# Patient Record
Sex: FEMALE | Race: BLACK OR AFRICAN AMERICAN | Employment: UNEMPLOYED | URBAN - METROPOLITAN AREA
[De-identification: names, ages, dates, MRNs, and addresses within clinical notes are randomized per-mention and may not be internally consistent; named-entity substitution may affect disease eponyms.]

---

## 2018-01-02 ENCOUNTER — HOSPITAL ENCOUNTER (EMERGENCY)
Age: 53
Discharge: HOME | End: 2018-01-02
Attending: EMERGENCY MEDICINE
Payer: MEDICAID

## 2018-01-02 VITALS
BODY MASS INDEX: 30.36 KG/M2 | TEMPERATURE: 98.4 F | HEIGHT: 62 IN | DIASTOLIC BLOOD PRESSURE: 105 MMHG | HEART RATE: 95 BPM | SYSTOLIC BLOOD PRESSURE: 167 MMHG | WEIGHT: 165 LBS | RESPIRATION RATE: 16 BRPM | OXYGEN SATURATION: 100 %

## 2018-01-02 DIAGNOSIS — G56.03 BILATERAL CARPAL TUNNEL SYNDROME: Primary | ICD-10-CM

## 2018-01-02 PROCEDURE — 99283 EMERGENCY DEPT VISIT LOW MDM: CPT

## 2018-01-02 PROCEDURE — 6370000000 HC RX 637 (ALT 250 FOR IP): Performed by: EMERGENCY MEDICINE

## 2018-01-02 RX ORDER — IBUPROFEN 800 MG/1
800 TABLET ORAL EVERY 8 HOURS PRN
Qty: 30 TABLET | Refills: 1 | Status: SHIPPED | OUTPATIENT
Start: 2018-01-02

## 2018-01-02 RX ORDER — IBUPROFEN 800 MG/1
800 TABLET ORAL ONCE
Status: COMPLETED | OUTPATIENT
Start: 2018-01-02 | End: 2018-01-02

## 2018-01-02 RX ADMIN — IBUPROFEN 800 MG: 800 TABLET, FILM COATED ORAL at 20:39

## 2018-01-02 ASSESSMENT — ENCOUNTER SYMPTOMS
SHORTNESS OF BREATH: 0
SORE THROAT: 0
BACK PAIN: 0
DIARRHEA: 0
VOMITING: 0
COUGH: 0

## 2018-01-02 ASSESSMENT — PAIN DESCRIPTION - DESCRIPTORS: DESCRIPTORS: BURNING;PINS AND NEEDLES

## 2018-01-02 ASSESSMENT — PAIN DESCRIPTION - ORIENTATION: ORIENTATION: RIGHT;LEFT

## 2018-01-02 ASSESSMENT — PAIN DESCRIPTION - PAIN TYPE: TYPE: ACUTE PAIN

## 2018-01-02 ASSESSMENT — PAIN SCALES - GENERAL
PAINLEVEL_OUTOF10: 5
PAINLEVEL_OUTOF10: 4

## 2018-01-02 ASSESSMENT — PAIN DESCRIPTION - LOCATION: LOCATION: HAND

## 2018-01-03 NOTE — ED PROVIDER NOTES
[facility]  ED  Emergency Department Encounter  Emergency Medicine Resident     Pt Name: Tahir Swift  MRN: [id number]  [place] 1965  Date of evaluation: 1/2/18  PCP:  Elenora Collet, [address] [place] [address]       Chief Complaint   Patient presents with    Numbness     bilat hand numbness radiating to elbows x 2 months       HISTORY OF PRESENT ILLNESS  (Location/Symptom, Timing/Onset, Context/Setting, Quality, Duration, Modifying Factors, Severity.)      Tahir Swift is a 47 y.o. female who presents with hand numbness and pain. Patient states for the past few months she has been dealing with bilateral hand numbness left greater than right. Patient is right-hand dominant. Patient states she has dropped her cigarette today and this alarmed her to come get evaluated. Patient denies any recent injury or trauma. Patient states she has the numbness and burning in all her fingers except her pinky fingers. Patient states sometimes it radiates up to her elbows bilaterally. Patient states is worse in the morning when she wakes up. Patient works at [employer] doing repetitive hand movements. Patient denies any fever or chills, nausea or vomiting, diarrhea, chest pain or shortness of breath. Patient denies any weakness numbness or tingling in her legs. Patient denies any neck pain. Patient has been trying Tylenol and Motrin with minimal relief. Patient denies any trauma or falls. PAST MEDICAL / SURGICAL / SOCIAL / FAMILY HISTORY      has a past medical history of Anxiety; Depression; and Hypertension. No past surgeries after reviewing this with the patient. Social History     Social History    Marital status: Single     Spouse name: N/A    Number of children: N/A    Years of education: N/A     Occupational History    Not on file.      Social History Main Topics    Smoking status: Current Every Day Smoker    Smokeless tobacco: Never Used    Alcohol use Yes      Comment: pt states a

## 2018-01-03 NOTE — ED PROVIDER NOTES
[facility]     Emergency Department     Faculty Attestation    I performed a history and physical examination of the patient and discussed management with the resident. I have reviewed and agree with the residents findings including all diagnostic interpretations, and treatment plans as written. Any areas of disagreement are noted on the chart. I was personally present for the key portions of any procedures. I have documented in the chart those procedures where I was not present during the key portions. I have reviewed the emergency nurses triage note. I agree with the chief complaint, past medical history, past surgical history, allergies, medications, social and family history as documented unless otherwise noted below. Documentation of the HPI, Physical Exam and Medical Decision Making performed by [email or web address] is based on my personal performance of the HPI, PE and MDM. For Physician Assistant/ Nurse Practitioner cases/documentation I have personally evaluated this patient and have completed at least one if not all key elements of the E/M (history, physical exam, and MDM). Additional findings are as noted. Primary Care Physician: Kerline Burleson,     History: This is a 47 y.o. female who presents to the Emergency Department with complaint of pain, numbness and tingling in her bilateral hands, patient reports symptoms in her thumb, index, and middle fingers. She denies symptoms in her pinkie finger. She denies prior episodes of this in the past. Has been having symptoms daily for past few months. She is RHD, and worse symptoms in her L hands. No trauma to the area. 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 Physical:   height is 5' 2\" (1.575 m) and weight is 165 lb (74.8 kg). Her temperature is 98.4 °F (36.9 °C). Her blood pressure is 167/105 (abnormal) and her pulse is 95. Her respiration is 16 and oxygen saturation is 100%. Patient with positive Tinel's, and positive Phalens test,   Patient is able to flex and extend at the wrist without decreased range of motion. She has sensation to light touch intact in all of her fingers, capillary refill less than 2 seconds. Palpable radial pulse that is equal bilaterally, no discoloration noted. Impression: bilateral carpal tunnel    Plan: Cock up splint, NSAIDS,   Discussed conservative management with patient, and off work for 2 days, pcp follow up. Pre-hypertension/Hypertension: The patient has been informed that they may have pre-hypertension or Hypertension based on a blood pressure reading in the emergency department. I recommend that the patient call the primary care provider listed on their discharge instructions or a physician of their choice this week to arrange follow up for further evaluation of possible pre-hypertension or Hypertension.         Krishna Morton D.O, M.P.H  Attending Emergency Medicine Physician         Krishna Morton DO  01/02/18 2044

## 2018-03-20 ENCOUNTER — HOSPITAL ENCOUNTER (OUTPATIENT)
Age: 53
Setting detail: SPECIMEN
Discharge: HOME | End: 2018-03-20
Payer: MEDICAID

## 2018-03-20 LAB
TSH SERPL DL<=0.05 MIU/L-ACNC: 0.99 MIU/L (ref 0.3–5)
VITAMIN B-12: 436 PG/ML (ref 232–1245)

## 2019-02-08 ENCOUNTER — HOSPITAL ENCOUNTER (OUTPATIENT)
Age: 54
Setting detail: SPECIMEN
Discharge: HOME | End: 2019-02-08
Payer: MEDICAID

## 2019-02-08 LAB
ABSOLUTE EOS #: 0.13 K/UL (ref 0–0.44)
ABSOLUTE IMMATURE GRANULOCYTE: <0.03 K/UL (ref 0–0.3)
ABSOLUTE LYMPH #: 1.82 K/UL (ref 1.1–3.7)
ABSOLUTE MONO #: 0.33 K/UL (ref 0.1–1.2)
ALBUMIN SERPL-MCNC: 4.6 G/DL (ref 3.5–5.2)
ALBUMIN/GLOBULIN RATIO: 1.3 (ref 1–2.5)
ALP BLD-CCNC: 95 U/L (ref 35–104)
ALT SERPL-CCNC: 30 U/L (ref 5–33)
ANION GAP SERPL CALCULATED.3IONS-SCNC: 17 MMOL/L (ref 9–17)
AST SERPL-CCNC: 33 U/L
BASOPHILS # BLD: 1 % (ref 0–2)
BASOPHILS ABSOLUTE: 0.05 K/UL (ref 0–0.2)
BILIRUB SERPL-MCNC: 0.67 MG/DL (ref 0.3–1.2)
BUN BLDV-MCNC: 14 MG/DL (ref 6–20)
BUN/CREAT BLD: ABNORMAL (ref 9–20)
CALCIUM SERPL-MCNC: 9.3 MG/DL (ref 8.6–10.4)
CHLORIDE BLD-SCNC: 105 MMOL/L (ref 98–107)
CHOLESTEROL/HDL RATIO: 2.9
CHOLESTEROL: 190 MG/DL
CO2: 19 MMOL/L (ref 20–31)
CREAT SERPL-MCNC: 0.53 MG/DL (ref 0.5–0.9)
DIFFERENTIAL TYPE: NORMAL
EOSINOPHILS RELATIVE PERCENT: 3 % (ref 1–4)
ESTIMATED AVERAGE GLUCOSE: 111 MG/DL
GFR AFRICAN AMERICAN: >60 ML/MIN
GFR NON-AFRICAN AMERICAN: >60 ML/MIN
GFR SERPL CREATININE-BSD FRML MDRD: ABNORMAL ML/MIN/{1.73_M2}
GFR SERPL CREATININE-BSD FRML MDRD: ABNORMAL ML/MIN/{1.73_M2}
GLUCOSE BLD-MCNC: 94 MG/DL (ref 70–99)
HBA1C MFR BLD: 5.5 % (ref 4–6)
HCT VFR BLD CALC: 41 % (ref 36.3–47.1)
HDLC SERPL-MCNC: 66 MG/DL
HEMOGLOBIN: 13.6 G/DL (ref 11.9–15.1)
IMMATURE GRANULOCYTES: 0 %
LDL CHOLESTEROL: 92 MG/DL (ref 0–130)
LYMPHOCYTES # BLD: 37 % (ref 24–43)
MCH RBC QN AUTO: 32.2 PG (ref 25.2–33.5)
MCHC RBC AUTO-ENTMCNC: 33.2 G/DL (ref 28.4–34.8)
MCV RBC AUTO: 96.9 FL (ref 82.6–102.9)
MONOCYTES # BLD: 7 % (ref 3–12)
NRBC AUTOMATED: 0 PER 100 WBC
PDW BLD-RTO: 12.7 % (ref 11.8–14.4)
PLATELET # BLD: 340 K/UL (ref 138–453)
PLATELET ESTIMATE: NORMAL
PMV BLD AUTO: 9.7 FL (ref 8.1–13.5)
POTASSIUM SERPL-SCNC: 3.6 MMOL/L (ref 3.7–5.3)
RBC # BLD: 4.23 M/UL (ref 3.95–5.11)
RBC # BLD: NORMAL 10*6/UL
SEG NEUTROPHILS: 52 % (ref 36–65)
SEGMENTED NEUTROPHILS ABSOLUTE COUNT: 2.56 K/UL (ref 1.5–8.1)
SODIUM BLD-SCNC: 141 MMOL/L (ref 135–144)
TOTAL PROTEIN: 8.1 G/DL (ref 6.4–8.3)
TRIGL SERPL-MCNC: 158 MG/DL
VLDLC SERPL CALC-MCNC: ABNORMAL MG/DL (ref 1–30)
WBC # BLD: 4.9 K/UL (ref 3.5–11.3)
WBC # BLD: NORMAL 10*3/UL

## 2020-05-26 ENCOUNTER — HOSPITAL ENCOUNTER (EMERGENCY)
Age: 55
Discharge: HOME | End: 2020-05-26
Attending: EMERGENCY MEDICINE
Payer: MEDICAID

## 2020-05-26 VITALS
RESPIRATION RATE: 18 BRPM | TEMPERATURE: 99.4 F | OXYGEN SATURATION: 98 % | DIASTOLIC BLOOD PRESSURE: 86 MMHG | SYSTOLIC BLOOD PRESSURE: 154 MMHG | HEART RATE: 86 BPM

## 2020-05-26 LAB
ABSOLUTE EOS #: 0.13 K/UL (ref 0–0.44)
ABSOLUTE IMMATURE GRANULOCYTE: <0.03 K/UL (ref 0–0.3)
ABSOLUTE LYMPH #: 2.66 K/UL (ref 1.1–3.7)
ABSOLUTE MONO #: 0.61 K/UL (ref 0.1–1.2)
ANION GAP SERPL CALCULATED.3IONS-SCNC: 15 MMOL/L (ref 9–17)
BASOPHILS # BLD: 1 % (ref 0–2)
BASOPHILS ABSOLUTE: 0.06 K/UL (ref 0–0.2)
BUN BLDV-MCNC: 16 MG/DL (ref 6–20)
BUN/CREAT BLD: ABNORMAL (ref 9–20)
CALCIUM SERPL-MCNC: 9.1 MG/DL (ref 8.6–10.4)
CHLORIDE BLD-SCNC: 104 MMOL/L (ref 98–107)
CO2: 19 MMOL/L (ref 20–31)
CREAT SERPL-MCNC: 0.36 MG/DL (ref 0.5–0.9)
DIFFERENTIAL TYPE: ABNORMAL
EOSINOPHILS RELATIVE PERCENT: 2 % (ref 1–4)
GFR AFRICAN AMERICAN: >60 ML/MIN
GFR NON-AFRICAN AMERICAN: >60 ML/MIN
GFR SERPL CREATININE-BSD FRML MDRD: ABNORMAL ML/MIN/{1.73_M2}
GFR SERPL CREATININE-BSD FRML MDRD: ABNORMAL ML/MIN/{1.73_M2}
GLUCOSE BLD-MCNC: 96 MG/DL (ref 70–99)
HCT VFR BLD CALC: 38 % (ref 36.3–47.1)
HEMOGLOBIN: 13 G/DL (ref 11.9–15.1)
IMMATURE GRANULOCYTES: 0 %
LYMPHOCYTES # BLD: 40 % (ref 24–43)
MCH RBC QN AUTO: 33.1 PG (ref 25.2–33.5)
MCHC RBC AUTO-ENTMCNC: 34.2 G/DL (ref 28.4–34.8)
MCV RBC AUTO: 96.7 FL (ref 82.6–102.9)
MONOCYTES # BLD: 9 % (ref 3–12)
NRBC AUTOMATED: 0 PER 100 WBC
PDW BLD-RTO: 12.9 % (ref 11.8–14.4)
PLATELET # BLD: 360 K/UL (ref 138–453)
PLATELET ESTIMATE: ABNORMAL
PMV BLD AUTO: 9 FL (ref 8.1–13.5)
POTASSIUM SERPL-SCNC: 4.5 MMOL/L (ref 3.7–5.3)
RBC # BLD: 3.93 M/UL (ref 3.95–5.11)
RBC # BLD: ABNORMAL 10*6/UL
SEG NEUTROPHILS: 48 % (ref 36–65)
SEGMENTED NEUTROPHILS ABSOLUTE COUNT: 3.14 K/UL (ref 1.5–8.1)
SODIUM BLD-SCNC: 138 MMOL/L (ref 135–144)
WBC # BLD: 6.6 K/UL (ref 3.5–11.3)
WBC # BLD: ABNORMAL 10*3/UL

## 2020-05-26 PROCEDURE — 85025 COMPLETE CBC W/AUTO DIFF WBC: CPT

## 2020-05-26 PROCEDURE — 6360000002 HC RX W HCPCS: Performed by: STUDENT IN AN ORGANIZED HEALTH CARE EDUCATION/TRAINING PROGRAM

## 2020-05-26 PROCEDURE — 96375 TX/PRO/DX INJ NEW DRUG ADDON: CPT

## 2020-05-26 PROCEDURE — 6370000000 HC RX 637 (ALT 250 FOR IP): Performed by: STUDENT IN AN ORGANIZED HEALTH CARE EDUCATION/TRAINING PROGRAM

## 2020-05-26 PROCEDURE — 80048 BASIC METABOLIC PNL TOTAL CA: CPT

## 2020-05-26 PROCEDURE — 99284 EMERGENCY DEPT VISIT MOD MDM: CPT

## 2020-05-26 PROCEDURE — 96374 THER/PROPH/DIAG INJ IV PUSH: CPT

## 2020-05-26 PROCEDURE — 93005 ELECTROCARDIOGRAM TRACING: CPT | Performed by: STUDENT IN AN ORGANIZED HEALTH CARE EDUCATION/TRAINING PROGRAM

## 2020-05-26 RX ORDER — PROCHLORPERAZINE EDISYLATE 5 MG/ML
10 INJECTION INTRAMUSCULAR; INTRAVENOUS ONCE
Status: COMPLETED | OUTPATIENT
Start: 2020-05-26 | End: 2020-05-26

## 2020-05-26 RX ORDER — DIPHENHYDRAMINE HYDROCHLORIDE 50 MG/ML
25 INJECTION INTRAMUSCULAR; INTRAVENOUS EVERY 6 HOURS PRN
Status: DISCONTINUED | OUTPATIENT
Start: 2020-05-26 | End: 2020-05-26 | Stop reason: HOSPADM

## 2020-05-26 RX ORDER — LISINOPRIL 2.5 MG/1
2.5 TABLET ORAL DAILY
Status: DISCONTINUED | OUTPATIENT
Start: 2020-05-26 | End: 2020-05-26 | Stop reason: HOSPADM

## 2020-05-26 RX ORDER — HYDROCHLOROTHIAZIDE 25 MG/1
12.5 TABLET ORAL DAILY
Status: DISCONTINUED | OUTPATIENT
Start: 2020-05-26 | End: 2020-05-26 | Stop reason: HOSPADM

## 2020-05-26 RX ORDER — HYDROCHLOROTHIAZIDE 25 MG/1
25 TABLET ORAL EVERY MORNING
Qty: 30 TABLET | Refills: 0 | Status: SHIPPED | OUTPATIENT
Start: 2020-05-26

## 2020-05-26 RX ORDER — LISINOPRIL 5 MG/1
2.5 TABLET ORAL DAILY
Qty: 15 TABLET | Refills: 0 | Status: SHIPPED | OUTPATIENT
Start: 2020-05-26

## 2020-05-26 RX ADMIN — HYDROCHLOROTHIAZIDE 12.5 MG: 25 TABLET ORAL at 13:52

## 2020-05-26 RX ADMIN — LISINOPRIL 2.5 MG: 2.5 TABLET ORAL at 13:52

## 2020-05-26 RX ADMIN — DIPHENHYDRAMINE HYDROCHLORIDE 25 MG: 50 INJECTION, SOLUTION INTRAMUSCULAR; INTRAVENOUS at 12:17

## 2020-05-26 RX ADMIN — PROCHLORPERAZINE EDISYLATE 10 MG: 5 INJECTION INTRAMUSCULAR; INTRAVENOUS at 12:18

## 2020-05-26 ASSESSMENT — ENCOUNTER SYMPTOMS
RHINORRHEA: 0
PHOTOPHOBIA: 0
DIARRHEA: 0
CHEST TIGHTNESS: 0
VOMITING: 0
SHORTNESS OF BREATH: 0
NAUSEA: 0
TROUBLE SWALLOWING: 0
WHEEZING: 0
BACK PAIN: 0
ABDOMINAL DISTENTION: 0
ABDOMINAL PAIN: 0
SORE THROAT: 0
CONSTIPATION: 0

## 2020-05-26 ASSESSMENT — PAIN SCALES - GENERAL: PAINLEVEL_OUTOF10: 7

## 2020-05-26 ASSESSMENT — PAIN DESCRIPTION - LOCATION: LOCATION: HEAD

## 2020-05-26 ASSESSMENT — PAIN DESCRIPTION - PAIN TYPE: TYPE: ACUTE PAIN

## 2020-05-26 ASSESSMENT — PAIN DESCRIPTION - DESCRIPTORS: DESCRIPTORS: ACHING

## 2020-05-26 NOTE — ED PROVIDER NOTES
Substance and Sexual Activity    Alcohol use: Yes     Comment: pt states a lot. She likes beer    Drug use: No    Sexual activity: Not on file   Lifestyle    Physical activity     Days per week: Not on file     Minutes per session: Not on file    Stress: Not on file   Relationships    Social connections     Talks on phone: Not on file     Gets together: Not on file     Attends Hinduism service: Not on file     Active member of club or organization: Not on file     Attends meetings of clubs or organizations: Not on file     Relationship status: Not on file    Intimate partner violence     Fear of current or ex partner: Not on file     Emotionally abused: Not on file     Physically abused: Not on file     Forced sexual activity: Not on file   Other Topics Concern    Not on file   Social History Narrative    Not on file       History reviewed. No pertinent family history. Allergies:  Patient has no known allergies. Home Medications:  Prior to Admission medications    Medication Sig Start Date End Date Taking? Authorizing Provider   lisinopril (PRINIVIL;ZESTRIL) 5 MG tablet Take 0.5 tablets by mouth daily 5/26/20  Yes Rosas Isabel MD   hydroCHLOROthiazide (HYDRODIURIL) 25 MG tablet Take 1 tablet by mouth every morning 5/26/20  Yes Rosas Isabel MD   ibuprofen (ADVIL;MOTRIN) 800 MG tablet Take 1 tablet by mouth every 8 hours as needed for Pain 1/2/18   Renetta Rios MD   ondansetron (ZOFRAN ODT) 4 MG disintegrating tablet Take 1 tablet by mouth every 8 hours as needed for Nausea. 2/26/15   Monique JOSETTE Riccip, APRN - CNP       REVIEW OFSYSTEMS    (2-9 systems for level 4, 10 or more for level 5)      Review of Systems   Constitutional: Negative for chills, fatigue and fever. HENT: Negative for hearing loss, rhinorrhea, sneezing, sore throat, tinnitus and trouble swallowing. Eyes: Negative for photophobia and visual disturbance.    Respiratory: Negative for chest tightness, shortness of breath and wheezing. Cardiovascular: Negative for chest pain and palpitations. Gastrointestinal: Negative for abdominal distention, abdominal pain, constipation, diarrhea, nausea and vomiting. Endocrine: Negative for polyuria. Genitourinary: Negative for dysuria, flank pain, frequency, vaginal bleeding and vaginal discharge. Musculoskeletal: Negative for arthralgias, back pain, gait problem and neck pain. Neurological: Positive for headaches. Negative for dizziness, tremors, seizures, syncope, facial asymmetry and numbness. Psychiatric/Behavioral: Negative for self-injury and suicidal ideas. PHYSICAL EXAM   (up to 7 for level 4, 8 or more forlevel 5)      INITIAL VITALS:   ED Triage Vitals   BP Temp Temp src Pulse Resp SpO2 Height Weight   05/26/20 1153 -- -- 05/26/20 1153 05/26/20 1152 05/26/20 1153 -- --   (!) 225/109   96 18 98 %         Physical Exam  Constitutional:       General: She is not in acute distress. Appearance: She is obese. She is not toxic-appearing or diaphoretic. HENT:      Head: Normocephalic and atraumatic. Eyes:      Extraocular Movements: Extraocular movements intact. Conjunctiva/sclera: Conjunctivae normal.      Pupils: Pupils are equal, round, and reactive to light. Neck:      Musculoskeletal: No neck rigidity or muscular tenderness. Cardiovascular:      Rate and Rhythm: Normal rate and regular rhythm. Pulses: Normal pulses. Pulmonary:      Effort: No respiratory distress. Breath sounds: Normal breath sounds. No wheezing. Abdominal:      General: There is no distension. Palpations: Abdomen is soft. Tenderness: There is no abdominal tenderness. Musculoskeletal: Normal range of motion. Skin:     General: Skin is dry. Neurological:      General: No focal deficit present. Mental Status: She is oriented to person, place, and time.    Psychiatric:         Mood and Affect: Mood normal.         Behavior: Behavior normal.

## 2020-05-27 LAB
EKG ATRIAL RATE: 88 BPM
EKG P AXIS: 69 DEGREES
EKG P-R INTERVAL: 146 MS
EKG Q-T INTERVAL: 380 MS
EKG QRS DURATION: 80 MS
EKG QTC CALCULATION (BAZETT): 459 MS
EKG R AXIS: 18 DEGREES
EKG T AXIS: 23 DEGREES
EKG VENTRICULAR RATE: 88 BPM

## 2020-05-27 PROCEDURE — 93010 ELECTROCARDIOGRAM REPORT: CPT | Performed by: INTERNAL MEDICINE

## 2020-11-11 ENCOUNTER — APPOINTMENT (OUTPATIENT)
Dept: GENERAL RADIOLOGY | Age: 55
End: 2020-11-11
Payer: MEDICAID

## 2020-11-11 ENCOUNTER — HOSPITAL ENCOUNTER (EMERGENCY)
Age: 55
Discharge: HOME | End: 2020-11-11
Attending: EMERGENCY MEDICINE
Payer: MEDICAID

## 2020-11-11 ENCOUNTER — APPOINTMENT (OUTPATIENT)
Dept: CT IMAGING | Age: 55
End: 2020-11-11
Payer: MEDICAID

## 2020-11-11 VITALS
WEIGHT: 170 LBS | RESPIRATION RATE: 20 BRPM | DIASTOLIC BLOOD PRESSURE: 81 MMHG | SYSTOLIC BLOOD PRESSURE: 166 MMHG | OXYGEN SATURATION: 100 % | HEART RATE: 93 BPM | BODY MASS INDEX: 27.32 KG/M2 | HEIGHT: 66 IN | TEMPERATURE: 97.8 F

## 2020-11-11 LAB
-: ABNORMAL
ABSOLUTE EOS #: 0.12 K/UL (ref 0–0.44)
ABSOLUTE IMMATURE GRANULOCYTE: <0.03 K/UL (ref 0–0.3)
ABSOLUTE LYMPH #: 1.83 K/UL (ref 1.1–3.7)
ABSOLUTE MONO #: 0.45 K/UL (ref 0.1–1.2)
ACETAMINOPHEN LEVEL: <5 UG/ML (ref 10–30)
ALBUMIN SERPL-MCNC: 4.4 G/DL (ref 3.5–5.2)
ALBUMIN/GLOBULIN RATIO: 1.4 (ref 1–2.5)
ALP BLD-CCNC: 101 U/L (ref 35–104)
ALT SERPL-CCNC: 64 U/L (ref 5–33)
AMMONIA: 19 UMOL/L (ref 11–51)
AMORPHOUS: ABNORMAL
ANION GAP SERPL CALCULATED.3IONS-SCNC: 10 MMOL/L (ref 9–17)
AST SERPL-CCNC: 60 U/L
BACTERIA: ABNORMAL
BASOPHILS # BLD: 1 % (ref 0–2)
BASOPHILS ABSOLUTE: 0.04 K/UL (ref 0–0.2)
BILIRUB SERPL-MCNC: 0.28 MG/DL (ref 0.3–1.2)
BILIRUBIN DIRECT: 0.09 MG/DL
BILIRUBIN URINE: NEGATIVE
BILIRUBIN, INDIRECT: 0.19 MG/DL (ref 0–1)
BUN BLDV-MCNC: 9 MG/DL (ref 6–20)
BUN/CREAT BLD: ABNORMAL (ref 9–20)
CALCIUM IONIZED: 1.16 MMOL/L (ref 1.13–1.33)
CALCIUM SERPL-MCNC: 9.3 MG/DL (ref 8.6–10.4)
CASTS UA: ABNORMAL /LPF (ref 0–8)
CHLORIDE BLD-SCNC: 104 MMOL/L (ref 98–107)
CO2: 24 MMOL/L (ref 20–31)
COLOR: YELLOW
CREAT SERPL-MCNC: 0.42 MG/DL (ref 0.5–0.9)
CRYSTALS, UA: ABNORMAL /HPF
DIFFERENTIAL TYPE: ABNORMAL
EOSINOPHILS RELATIVE PERCENT: 2 % (ref 1–4)
EPITHELIAL CELLS UA: ABNORMAL /HPF (ref 0–5)
ETHANOL PERCENT: <0.01 %
ETHANOL: <10 MG/DL
GFR AFRICAN AMERICAN: >60 ML/MIN
GFR NON-AFRICAN AMERICAN: >60 ML/MIN
GFR SERPL CREATININE-BSD FRML MDRD: ABNORMAL ML/MIN/{1.73_M2}
GFR SERPL CREATININE-BSD FRML MDRD: ABNORMAL ML/MIN/{1.73_M2}
GLOBULIN: ABNORMAL G/DL (ref 1.5–3.8)
GLUCOSE BLD-MCNC: 97 MG/DL (ref 70–99)
GLUCOSE URINE: NEGATIVE
HCT VFR BLD CALC: 36 % (ref 36.3–47.1)
HEMOGLOBIN: 11.7 G/DL (ref 11.9–15.1)
IMMATURE GRANULOCYTES: 0 %
INR BLD: 0.9
KETONES, URINE: NEGATIVE
LEUKOCYTE ESTERASE, URINE: NEGATIVE
LYMPHOCYTES # BLD: 31 % (ref 24–43)
MAGNESIUM: 2.1 MG/DL (ref 1.6–2.6)
MCH RBC QN AUTO: 32.4 PG (ref 25.2–33.5)
MCHC RBC AUTO-ENTMCNC: 32.5 G/DL (ref 28.4–34.8)
MCV RBC AUTO: 99.7 FL (ref 82.6–102.9)
MONOCYTES # BLD: 8 % (ref 3–12)
MUCUS: ABNORMAL
NITRITE, URINE: NEGATIVE
NRBC AUTOMATED: 0 PER 100 WBC
OTHER OBSERVATIONS UA: ABNORMAL
PDW BLD-RTO: 13 % (ref 11.8–14.4)
PH UA: 5 (ref 5–8)
PHOSPHORUS: 3.6 MG/DL (ref 2.6–4.5)
PLATELET # BLD: 306 K/UL (ref 138–453)
PLATELET ESTIMATE: ABNORMAL
PMV BLD AUTO: 8.8 FL (ref 8.1–13.5)
POTASSIUM SERPL-SCNC: 4.2 MMOL/L (ref 3.7–5.3)
PROTEIN UA: NEGATIVE
PROTHROMBIN TIME: 9.6 SEC (ref 9–12)
RBC # BLD: 3.61 M/UL (ref 3.95–5.11)
RBC # BLD: ABNORMAL 10*6/UL
RBC UA: ABNORMAL /HPF (ref 0–4)
RENAL EPITHELIAL, UA: ABNORMAL /HPF
SALICYLATE LEVEL: <1 MG/DL (ref 3–10)
SEG NEUTROPHILS: 58 % (ref 36–65)
SEGMENTED NEUTROPHILS ABSOLUTE COUNT: 3.39 K/UL (ref 1.5–8.1)
SODIUM BLD-SCNC: 138 MMOL/L (ref 135–144)
SPECIFIC GRAVITY UA: 1.01 (ref 1–1.03)
TOTAL PROTEIN: 7.6 G/DL (ref 6.4–8.3)
TOXIC TRICYCLIC SC,BLOOD: NEGATIVE
TRICHOMONAS: ABNORMAL
TURBIDITY: CLEAR
URINE HGB: ABNORMAL
UROBILINOGEN, URINE: NORMAL
WBC # BLD: 5.8 K/UL (ref 3.5–11.3)
WBC # BLD: ABNORMAL 10*3/UL
WBC UA: ABNORMAL /HPF (ref 0–5)
YEAST: ABNORMAL

## 2020-11-11 PROCEDURE — 99284 EMERGENCY DEPT VISIT MOD MDM: CPT

## 2020-11-11 PROCEDURE — 82140 ASSAY OF AMMONIA: CPT

## 2020-11-11 PROCEDURE — 83735 ASSAY OF MAGNESIUM: CPT

## 2020-11-11 PROCEDURE — 82330 ASSAY OF CALCIUM: CPT

## 2020-11-11 PROCEDURE — 85025 COMPLETE CBC W/AUTO DIFF WBC: CPT

## 2020-11-11 PROCEDURE — 71045 X-RAY EXAM CHEST 1 VIEW: CPT

## 2020-11-11 PROCEDURE — 81001 URINALYSIS AUTO W/SCOPE: CPT

## 2020-11-11 PROCEDURE — 80307 DRUG TEST PRSMV CHEM ANLYZR: CPT

## 2020-11-11 PROCEDURE — 85610 PROTHROMBIN TIME: CPT

## 2020-11-11 PROCEDURE — 70450 CT HEAD/BRAIN W/O DYE: CPT

## 2020-11-11 PROCEDURE — 80048 BASIC METABOLIC PNL TOTAL CA: CPT

## 2020-11-11 PROCEDURE — 84100 ASSAY OF PHOSPHORUS: CPT

## 2020-11-11 PROCEDURE — G0480 DRUG TEST DEF 1-7 CLASSES: HCPCS

## 2020-11-11 PROCEDURE — 80076 HEPATIC FUNCTION PANEL: CPT

## 2020-11-11 RX ORDER — FOLIC ACID 5 MG/ML
1 INJECTION, SOLUTION INTRAMUSCULAR; INTRAVENOUS; SUBCUTANEOUS DAILY
Status: DISCONTINUED | OUTPATIENT
Start: 2020-11-11 | End: 2020-11-11 | Stop reason: SDUPTHER

## 2020-11-11 RX ORDER — CYANOCOBALAMIN 1000 UG/ML
1000 INJECTION INTRAMUSCULAR; SUBCUTANEOUS ONCE
Status: DISCONTINUED | OUTPATIENT
Start: 2020-11-11 | End: 2020-11-11 | Stop reason: HOSPADM

## 2020-11-11 ASSESSMENT — ENCOUNTER SYMPTOMS
SHORTNESS OF BREATH: 0
EYE PAIN: 0
ABDOMINAL PAIN: 0
BACK PAIN: 0

## 2020-11-11 NOTE — ED NOTES
Patient sent from [facility]. Pt confused and does not understand why she is here. A&O x3, not oriented to time. Pt in bed and upset, call light in reach.      Virginie Smith  11/11/20 [id number]

## 2020-11-11 NOTE — CARE COORDINATION
Writer was able to contact pts sister Alva Keane that's states family will  pt at discharge and watch over her for safety

## 2020-11-11 NOTE — ED PROVIDER NOTES
FACULTY SIGN-OUT  ADDENDUM       Patient: Madhavi Colby   MRN: [id number]  PCP:  Vic Castillo DO  Attestation  I was available and discussed any additional care issues that arose and coordinated the management plans with the resident(s) caring for the patient during my duty period. Any areas of disagreement with resident's documentation of care or procedures are noted on the chart. I was personally present for the key portions of any/all procedures during my duty period. I have documented in the chart those procedures where I was not present during the key portions. The patient's initial evaluation and plan have been discussed with the prior provider who initially evaluated the patient. Pertinent Comments: The patient is a 63 y.o. female taken in signout with history of intermittent confusion referred by PCP for further evaluation.     We are awaiting work-up and reevaluation with possible admission versus outpatient further work-up    ED COURSE      The patient was given the following medications:  Orders Placed This Encounter   Medications    DISCONTD: thiamine (B-1) 100 mg in sodium chloride 0.9 % 100 mL IVPB    DISCONTD: folic acid injection 1 mg    cyanocobalamin injection 0,283 mcg    folic acid 1 mg, thiamine (B-1) 100 mg in dextrose 5 % 50 mL IVPB       RECENT VITALS:   BP: (!) 166/81  Pulse: 93  Resp: 20  Temp: 97.8 °F (36.6 °C) SpO2: 100 %    (Please note that portions of this note were completed with a voice recognition program.  Efforts were made to edit the dictations but occasionally words are mis-transcribed.)    MD Moi Szymanski  Attending Emergency Medicine Physician       Josiane Lopes MD  11/11/20 2474

## 2020-11-11 NOTE — ED PROVIDER NOTES
[facility]  ED  eMERGENCY dEPARTMENT eNCOUnter    Pt Name: Becky Ceballos  MRN: [id number]  [place] 1965  Date of evaluation: 11/11/2020  PCP:  Bhavya Rasheed, [address] [place] [address]       Chief Complaint   Patient presents with    Altered Mental Status         HISTORY OF PRESENT ILLNESS    Becky Ceballos is a 63 y.o. female who presents in tears unsure why she is hear. The pt was at her apt with NP Short today and was sent over. The pt has no complaints right now so History needed to be obtained by Sending provider. The pt is unable to elaborate on her symptoms. She notes not wanting to be here but cannot remember phone numbers for rides or family. 11/11/20  1:15 PM EST  History obtained from Provider. The patient has a history of alcoholism as well as hypertension patient was concerning to him as she was unable to answer question was tearful and at the time of his evaluation was not alert to time or situation. The pt was unable to elaborate on things and he was unable to get incontact with next of kin or medical contacts. We are also unable to get a hold of medical contacts at this time         REVIEW OF SYSTEMS     Review of Systems   Constitutional: Negative for fever. HENT: Negative for ear pain. Eyes: Negative for pain. Respiratory: Negative for shortness of breath. Cardiovascular: Negative for chest pain. Gastrointestinal: Negative for abdominal pain. Genitourinary: Negative for dysuria. Musculoskeletal: Negative for back pain. Skin: Negative for wound. Neurological: Negative for headaches. PAST MEDICAL HISTORY    has a past medical history of Anxiety, Depression, and Hypertension. SURGICAL HISTORY      has no past surgical history on file.       CURRENT MEDICATIONS       Previous Medications    HYDROCHLOROTHIAZIDE (HYDRODIURIL) 25 MG TABLET    Take 1 tablet by mouth every morning    IBUPROFEN (ADVIL;MOTRIN) 800 MG TABLET    Take 1 tablet by mouth every 8 hours as needed for Pain    LISINOPRIL (PRINIVIL;ZESTRIL) 5 MG TABLET    Take 0.5 tablets by mouth daily    ONDANSETRON (ZOFRAN ODT) 4 MG DISINTEGRATING TABLET    Take 1 tablet by mouth every 8 hours as needed for Nausea. ALLERGIES     has No Known Allergies. FAMILY HISTORY     has no family status information on file. family history is not on file. SOCIAL HISTORY      reports that she has been smoking. She has never used smokeless tobacco. She reports current alcohol use. She reports that she does not use drugs. PHYSICAL EXAM     INITIAL VITALS:  height is 5' 6\" (1.676 m) and weight is 170 lb (77.1 kg). Her skin temperature is 97.8 °F (36.6 °C). Her blood pressure is 166/81 (abnormal) and her pulse is 93. Her respiration is 20 and oxygen saturation is 100%. Physical Exam  Constitutional:       Appearance: She is well-developed. She is not diaphoretic. HENT:      Head: Normocephalic and atraumatic. Right Ear: External ear normal.      Left Ear: External ear normal.   Eyes:      General: No visual field deficit or scleral icterus. Right eye: No discharge. Left eye: No discharge. Neck:      Musculoskeletal: Normal range of motion. Trachea: No tracheal deviation. Pulmonary:      Effort: Pulmonary effort is normal. No respiratory distress. Breath sounds: No stridor. Musculoskeletal: Normal range of motion. Skin:     General: Skin is warm and dry. Neurological:      Mental Status: She is alert and oriented to person, place, and time. Cranial Nerves: No cranial nerve deficit, dysarthria or facial asymmetry. Sensory: No sensory deficit. Motor: No weakness. Coordination: Coordination normal.      Comments: Mini Mental status exam pt able to repeat 3 items with early memory.   But at 10 minutes no longer able to remember simple items  The pt could not count backward from 100 by 7s so concentration is slightly decreased Psychiatric:         Behavior: Behavior normal.           DIFFERENTIAL DIAGNOSIS/MDM:   The patient presents for evaluation of altered mental status. Based on the presentation the patient is unlikely to suffer from Jack Asai with normal neuro exam patient has no signs of trauma no signs of infection awaiting urine still at this time. Patient does not have any signs of overdose will ensure no hepatic encephalopathy patient does not have any severe electrolyte disorders or alcohol intoxication. DIAGNOSTIC RESULTS         RADIOLOGY:   I directly visualized the following  images and reviewed the radiologist interpretations:  XR CHEST PORTABLE   Final Result   No acute cardiopulmonary pathology. CT HEAD WO CONTRAST   Final Result   No acute intracranial abnormality.            No bleed no pneumoina    LABS:  Labs Reviewed   TOX SCR, BLD, ED - Abnormal; Notable for the following components:       Result Value    Acetaminophen Level <5 (*)     Salicylate Lvl <1 (*)     All other components within normal limits   BASIC METABOLIC PANEL - Abnormal; Notable for the following components:    CREATININE 0.42 (*)     All other components within normal limits   HEPATIC FUNCTION PANEL - Abnormal; Notable for the following components:    ALT 64 (*)     AST 60 (*)     Total Bilirubin 0.28 (*)     All other components within normal limits   CBC WITH AUTO DIFFERENTIAL - Abnormal; Notable for the following components:    RBC 3.61 (*)     Hemoglobin 11.7 (*)     Hematocrit 36.0 (*)     All other components within normal limits   CALCIUM, IONIZED   MAGNESIUM   PHOSPHORUS   PROTIME-INR   URINALYSIS WITH MICROSCOPIC   AMMONIA     Patient does not have macrocytic anemia only mild anemia  Awaiting ammonia patient does have elevated ALT and AST but will need the ammonia as a possible cause of altered mental status      EMERGENCY DEPARTMENT COURSE:   Vitals:    Vitals:    11/11/20 1134 11/11/20 1149   BP:  (!) 166/81   Pulse:  93

## 2021-02-02 ENCOUNTER — OFFICE VISIT (OUTPATIENT)
Dept: NEUROLOGY | Age: 56
End: 2021-02-02
Payer: MEDICAID

## 2021-02-02 VITALS — BODY MASS INDEX: 27.44 KG/M2 | WEIGHT: 170 LBS

## 2021-02-02 DIAGNOSIS — R41.3 MEMORY DISORDER: Primary | ICD-10-CM

## 2021-02-02 PROCEDURE — 3017F COLORECTAL CA SCREEN DOC REV: CPT | Performed by: STUDENT IN AN ORGANIZED HEALTH CARE EDUCATION/TRAINING PROGRAM

## 2021-02-02 PROCEDURE — 99215 OFFICE O/P EST HI 40 MIN: CPT | Performed by: STUDENT IN AN ORGANIZED HEALTH CARE EDUCATION/TRAINING PROGRAM

## 2021-02-02 PROCEDURE — G8427 DOCREV CUR MEDS BY ELIG CLIN: HCPCS | Performed by: STUDENT IN AN ORGANIZED HEALTH CARE EDUCATION/TRAINING PROGRAM

## 2021-02-02 PROCEDURE — G8419 CALC BMI OUT NRM PARAM NOF/U: HCPCS | Performed by: STUDENT IN AN ORGANIZED HEALTH CARE EDUCATION/TRAINING PROGRAM

## 2021-02-02 PROCEDURE — G8484 FLU IMMUNIZE NO ADMIN: HCPCS | Performed by: STUDENT IN AN ORGANIZED HEALTH CARE EDUCATION/TRAINING PROGRAM

## 2021-02-02 PROCEDURE — 4004F PT TOBACCO SCREEN RCVD TLK: CPT | Performed by: STUDENT IN AN ORGANIZED HEALTH CARE EDUCATION/TRAINING PROGRAM

## 2021-02-02 RX ORDER — MULTIVITAMIN WITH FOLIC ACID 400 MCG
TABLET ORAL
COMMUNITY
Start: 2021-01-29

## 2021-02-02 ASSESSMENT — ENCOUNTER SYMPTOMS
RESPIRATORY NEGATIVE: 1
GASTROINTESTINAL NEGATIVE: 1
EYES NEGATIVE: 1

## 2021-02-02 NOTE — PROGRESS NOTES
[address] [place] [address] # [address] [id number]  Dept: [phone]  Dept Fax: [phone]    NEUROLOGY NEW PATIENT NOTE       PATIENT NAME: Urmila Lewis  PATIENT MRN: [id number]  PRIMARY CARE PHYSICIAN: Handy Tate DO      HPI:      Urmila Lewis is a 63 y.o. female with history of migraine headache, anxiety, depression, who was referred for the evaluation of memory loss. Patient reports that she came today with her boyfriend who is sitting outside. She reports having difficulty with remembering to take her medication. No urinary incontinence or gait instability. Her boyfriendreports that she has been likes this since he knew her. I spoke to her daughter Tali Becerra who stated she has been having this memory problems for long times \"10 to 30 years\". Tali Becerra reports that her mom has history of either schizophrenia or bipolar disorder and hasn't followed up with psychiatrist for long time. She's not seeing any psychiatrist recently and hasn't been taking medication for long time. She drinks a beer every day. No other alcoholic drinks  She current active smoker 3 cigarettes per day since she was 12  She occasionally smokes marijuana     PREVIOUS WORKUP:     CT head w/o (4/18/2018): No evidence of intracranial hemorrhage, hypodense abnormality or acute pathology. No significant interval change from prior examination. Lab Results   Component Value Date    WBC 5.8 11/11/2020    HGB 11.7 (L) 11/11/2020    HCT 36.0 (L) 11/11/2020    MCV 99.7 11/11/2020     11/11/2020       Past Medical History:   Diagnosis Date    Anxiety     Depression     Hypertension         No past surgical history on file.      Social History     Socioeconomic History    Marital status: Single     Spouse name: Not on file    Number of children: Not on file    Years of education: Not on file    Highest education level: Not on file   Occupational History    Not on file   Social Needs    Financial resource strain: Not on file    Food insecurity     Worry: Not on file     Inability: Not on file    Transportation needs     Medical: Not on file     Non-medical: Not on file   Tobacco Use    Smoking status: Current Every Day Smoker    Smokeless tobacco: Never Used   Substance and Sexual Activity    Alcohol use: Yes     Comment: pt states a lot. She likes beer    Drug use: No    Sexual activity: Not on file   Lifestyle    Physical activity     Days per week: Not on file     Minutes per session: Not on file    Stress: Not on file   Relationships    Social connections     Talks on phone: Not on file     Gets together: Not on file     Attends Hindu service: Not on file     Active member of club or organization: Not on file     Attends meetings of clubs or organizations: Not on file     Relationship status: Not on file    Intimate partner violence     Fear of current or ex partner: Not on file     Emotionally abused: Not on file     Physically abused: Not on file     Forced sexual activity: Not on file   Other Topics Concern    Not on file   Social History Narrative    Not on file        Current Outpatient Medications   Medication Sig Dispense Refill    lisinopril (PRINIVIL;ZESTRIL) 5 MG tablet Take 0.5 tablets by mouth daily 15 tablet 0    hydroCHLOROthiazide (HYDRODIURIL) 25 MG tablet Take 1 tablet by mouth every morning 30 tablet 0    ibuprofen (ADVIL;MOTRIN) 800 MG tablet Take 1 tablet by mouth every 8 hours as needed for Pain 30 tablet 1    ondansetron (ZOFRAN ODT) 4 MG disintegrating tablet Take 1 tablet by mouth every 8 hours as needed for Nausea. 20 tablet 0     No current facility-administered medications for this visit. No Known Allergies     REVIEW OF SYSTEMS:     Review of Systems   Constitutional: Negative. HENT: Negative. Eyes: Negative. Respiratory: Negative. Gastrointestinal: Negative. Musculoskeletal: Negative.     Skin: Negative. Neurological: Negative for tremors, seizures, syncope, speech difficulty, weakness, light-headedness, numbness and headaches. Psychiatric/Behavioral: Positive for behavioral problems, decreased concentration and dysphoric mood. Negative for suicidal ideas. The patient is nervous/anxious. VITALS  There were no vitals taken for this visit. PHYSICAL EXAMINATION:     Physical Exam  Constitutional:       Appearance: Normal appearance. Eyes:      Extraocular Movements: Extraocular movements intact. Pupils: Pupils are equal, round, and reactive to light. Neck:      Musculoskeletal: Normal range of motion and neck supple. Cardiovascular:      Rate and Rhythm: Normal rate and regular rhythm. Pulmonary:      Effort: Pulmonary effort is normal. No respiratory distress. Abdominal:      General: Abdomen is flat. Palpations: Abdomen is soft. Skin:     General: Skin is dry. Neurological:      Mental Status: She is alert. Gait: Gait is intact. Deep Tendon Reflexes: Strength normal.   Psychiatric:         Attention and Perception: She is inattentive. She does not perceive auditory or visual hallucinations. Mood and Affect: Mood is anxious. Affect is tearful. Speech: Speech is delayed. Behavior: Behavior is slowed. Thought Content: Thought content is paranoid. Thought content does not include homicidal or suicidal ideation. Thought content does not include homicidal or suicidal plan. Neurologic Exam     Mental Status   Oriented to person. Oriented to place. Disoriented to street and number. Oriented to country, city and area. Disoriented to time. Disoriented to year, month, date and day. Oriented to season. Registration: recalls 3 of 3 objects. Recall of objects at 5 minutes: recalls 0 out of 3. Level of consciousness: alert  Able to name object. Cranial Nerves   Cranial nerves II through XII intact.      CN III, IV, VI

## 2021-12-01 DIAGNOSIS — R04.2 HEMOPTYSIS: Primary | ICD-10-CM

## 2022-05-03 ENCOUNTER — HOSPITAL ENCOUNTER (EMERGENCY)
Age: 57
Discharge: OTHER INSTITUTION | End: 2022-05-03
Attending: EMERGENCY MEDICINE
Payer: MEDICAID

## 2022-05-03 ENCOUNTER — APPOINTMENT (OUTPATIENT)
Dept: GENERAL RADIOLOGY | Age: 57
End: 2022-05-03
Payer: MEDICAID

## 2022-05-03 VITALS
DIASTOLIC BLOOD PRESSURE: 70 MMHG | RESPIRATION RATE: 16 BRPM | HEIGHT: 62 IN | OXYGEN SATURATION: 100 % | TEMPERATURE: 100 F | SYSTOLIC BLOOD PRESSURE: 123 MMHG | BODY MASS INDEX: 29.81 KG/M2 | HEART RATE: 98 BPM | WEIGHT: 162 LBS

## 2022-05-03 DIAGNOSIS — F14.90 COCAINE USE: ICD-10-CM

## 2022-05-03 DIAGNOSIS — F10.10 ALCOHOL USE DISORDER, MILD, ABUSE: Primary | ICD-10-CM

## 2022-05-03 LAB
ABSOLUTE EOS #: 0.1 K/UL (ref 0–0.44)
ABSOLUTE IMMATURE GRANULOCYTE: <0.03 K/UL (ref 0–0.3)
ABSOLUTE LYMPH #: 3.02 K/UL (ref 1.1–3.7)
ABSOLUTE MONO #: 0.52 K/UL (ref 0.1–1.2)
ALBUMIN SERPL-MCNC: 4.8 G/DL (ref 3.5–5.2)
ALBUMIN/GLOBULIN RATIO: 1.4 (ref 1–2.5)
ALP BLD-CCNC: 108 U/L (ref 35–104)
ALT SERPL-CCNC: 55 U/L (ref 5–33)
AMPHETAMINE SCREEN URINE: NEGATIVE
ANION GAP SERPL CALCULATED.3IONS-SCNC: 13 MMOL/L (ref 9–17)
AST SERPL-CCNC: 49 U/L
BARBITURATE SCREEN URINE: NEGATIVE
BASOPHILS # BLD: 1 % (ref 0–2)
BASOPHILS ABSOLUTE: 0.06 K/UL (ref 0–0.2)
BENZODIAZEPINE SCREEN, URINE: NEGATIVE
BILIRUB SERPL-MCNC: 0.45 MG/DL (ref 0.3–1.2)
BUN BLDV-MCNC: 13 MG/DL (ref 6–20)
CALCIUM SERPL-MCNC: 9.7 MG/DL (ref 8.6–10.4)
CANNABINOID SCREEN URINE: POSITIVE
CHLORIDE BLD-SCNC: 99 MMOL/L (ref 98–107)
CO2: 24 MMOL/L (ref 20–31)
COCAINE METABOLITE, URINE: POSITIVE
CREAT SERPL-MCNC: 0.48 MG/DL (ref 0.5–0.9)
EOSINOPHILS RELATIVE PERCENT: 1 % (ref 1–4)
ETHANOL PERCENT: 0.04 %
ETHANOL: 44 MG/DL
GFR AFRICAN AMERICAN: >60 ML/MIN
GFR NON-AFRICAN AMERICAN: >60 ML/MIN
GFR SERPL CREATININE-BSD FRML MDRD: ABNORMAL ML/MIN/{1.73_M2}
GLUCOSE BLD-MCNC: 123 MG/DL (ref 70–99)
HCG QUALITATIVE: NEGATIVE
HCT VFR BLD CALC: 39 % (ref 36.3–47.1)
HEMOGLOBIN: 13.3 G/DL (ref 11.9–15.1)
IMMATURE GRANULOCYTES: 0 %
LYMPHOCYTES # BLD: 43 % (ref 24–43)
MCH RBC QN AUTO: 32.4 PG (ref 25.2–33.5)
MCHC RBC AUTO-ENTMCNC: 34.1 G/DL (ref 28.4–34.8)
MCV RBC AUTO: 94.9 FL (ref 82.6–102.9)
METHADONE SCREEN, URINE: NEGATIVE
MONOCYTES # BLD: 7 % (ref 3–12)
NRBC AUTOMATED: 0 PER 100 WBC
OPIATES, URINE: NEGATIVE
OXYCODONE SCREEN URINE: NEGATIVE
PDW BLD-RTO: 12.3 % (ref 11.8–14.4)
PHENCYCLIDINE, URINE: NEGATIVE
PLATELET # BLD: 423 K/UL (ref 138–453)
PMV BLD AUTO: 8.6 FL (ref 8.1–13.5)
POTASSIUM SERPL-SCNC: 4.1 MMOL/L (ref 3.7–5.3)
RBC # BLD: 4.11 M/UL (ref 3.95–5.11)
SARS-COV-2, RAPID: NOT DETECTED
SEG NEUTROPHILS: 48 % (ref 36–65)
SEGMENTED NEUTROPHILS ABSOLUTE COUNT: 3.27 K/UL (ref 1.5–8.1)
SODIUM BLD-SCNC: 136 MMOL/L (ref 135–144)
SPECIMEN DESCRIPTION: NORMAL
TEST INFORMATION: ABNORMAL
TOTAL PROTEIN: 8.3 G/DL (ref 6.4–8.3)
TROPONIN, HIGH SENSITIVITY: <6 NG/L (ref 0–14)
WBC # BLD: 7 K/UL (ref 3.5–11.3)

## 2022-05-03 PROCEDURE — 71045 X-RAY EXAM CHEST 1 VIEW: CPT

## 2022-05-03 PROCEDURE — H0050 ALCOHOL/DRUG SERVICE 15 MIN: HCPCS | Performed by: SOCIAL WORKER

## 2022-05-03 PROCEDURE — G0480 DRUG TEST DEF 1-7 CLASSES: HCPCS

## 2022-05-03 PROCEDURE — 85025 COMPLETE CBC W/AUTO DIFF WBC: CPT

## 2022-05-03 PROCEDURE — 80307 DRUG TEST PRSMV CHEM ANLYZR: CPT

## 2022-05-03 PROCEDURE — 99285 EMERGENCY DEPT VISIT HI MDM: CPT

## 2022-05-03 PROCEDURE — 87635 SARS-COV-2 COVID-19 AMP PRB: CPT

## 2022-05-03 PROCEDURE — 93005 ELECTROCARDIOGRAM TRACING: CPT | Performed by: STUDENT IN AN ORGANIZED HEALTH CARE EDUCATION/TRAINING PROGRAM

## 2022-05-03 PROCEDURE — 84703 CHORIONIC GONADOTROPIN ASSAY: CPT

## 2022-05-03 PROCEDURE — 84484 ASSAY OF TROPONIN QUANT: CPT

## 2022-05-03 PROCEDURE — 80053 COMPREHEN METABOLIC PANEL: CPT

## 2022-05-03 ASSESSMENT — ENCOUNTER SYMPTOMS
RHINORRHEA: 0
DIARRHEA: 0
BACK PAIN: 0
NAUSEA: 0
ABDOMINAL PAIN: 0
COUGH: 0
SHORTNESS OF BREATH: 1
VOMITING: 0

## 2022-05-03 ASSESSMENT — LIFESTYLE VARIABLES
HOW MANY STANDARD DRINKS CONTAINING ALCOHOL DO YOU HAVE ON A TYPICAL DAY: 5 OR 6
HOW OFTEN DO YOU HAVE A DRINK CONTAINING ALCOHOL: 4 OR MORE TIMES A WEEK

## 2022-05-03 NOTE — ED NOTES
Writer met with patient at bedside regarding concerns for substance use and desire to stop use. Patient reports to using alcohol and crack cocaine for the past 30 years. Patient reports to using alcohol daily, starting first thing in the morning and continues throughout the day. She reports that she has never withdrawn from alcohol as she \"I always have it, I'm never without it. \" She reports to drinking 6 24oz beers a day \"more or less. \" She reports last drink was this morning. She reports in the last 5-6 years she has been using crack more frequently and finds that she is no longer able to manage her use. Historically, patient reports being able to control or manage her crack use, stopping use entirely for a year or more at a time. She reports to using 5 times a week and realizes the neglect that she is having on her body and family as a result of substance use. She reports last crack use was yesterday. Patient is tearful during discussion. She reports that she has never been involved in formal substance use treatment. She reports that she has never evaluated her alcohol use or thought that it was problematic until recently. She states that she cannot go home, \"if I go home I will use and I will drink, I know it and I can't do this anymore. \"  Patient reports being ready to stop her use and educate herself regarding triggers for use and learn healthy coping techniques. \"I am determined to do this. \"    She denies thoughts of harm to self and others. Patient is asking for inpatient substance use treatment, writer and patient explored treatment options available. Patient requesting [facility].      Molina Puente, [place]  05/03/22 [address] [facility] 101, [place]  05/03/22 1150

## 2022-05-03 NOTE — ED NOTES
Sophia ROGERS at bedside to obtain pt's belongings and secure them in locked cabinets.      Joshua Pacheco RN  05/03/22 0339

## 2022-05-03 NOTE — ED NOTES
Labeled urine specimen sent to lab via tube system.     [x] Urine Sample   [x]  Clean catch   [] Straight cath   [] Urine voided   []  Indwelling catheter   []  Suprapubic catheter     Precious Zavala RN  05/03/22 9425

## 2022-05-03 NOTE — ED PROVIDER NOTES
This patient was signed out to me by Dr. Cindy Austin at the completion of his shift. She presented with complaint of crack cocaine and alcohol abuse and requests evaluation for possible inpatient substance abuse treatment. Additional lab work is pending. Anticipate transfer to [facility] for substance abuse treatment.      Man Olmstead MD  05/04/22 1378

## 2022-05-03 NOTE — ED NOTES
Writer followed up with Davon, no decision at this time. Madyson Miranda will call back.       [place] Mares  05/03/22 8410

## 2022-05-03 NOTE — ED NOTES
The following labs labeled with pt sticker and tubed to lab:     [] Blue     [x] Lavender   [] on ice  [x] Green/yellow  [x] Green/black [] on ice  [] Yellow  [x] Red  [] Pink      [] COVID-19 swab    [] Rapid  [] PCR  [] Flu swab  [] Peds Viral Panel     [] Urine Sample  [] Pelvic Cultures  [] Blood Cultures            Bobbi Mayberry RN  05/03/22 2974

## 2022-05-03 NOTE — ED NOTES
Patient presents to the ED with c/o alcohol/drug assessment. Patient notes that she has no thoughts of harming herself or others. Patient notes that she smokes crack cocaine and is afraid she will harm herself by smoking too much, or losing custody of her child. Patient also notes mild SOB. Patient has a sitter at bedside at this time until social work assesses patient. Patient is alert and oriented x4, answering questions appropriately. Patient is changed into paper scrubs, placed on cardiac monitor, EKG done, IV established, will continue to monitor.           Jones Bishop RN  05/03/22 5671

## 2022-05-03 NOTE — ED PROVIDER NOTES
[facility]     Emergency Department     Faculty Note/ Attestation      Pt Name: Sam Luong                                       MRN: [id number]  [place] 1965  Date of evaluation: 5/3/2022    Patients PCP:    Shobha Diane MD      Attestation  I performed a history and physical examination of the patient and discussed management with the resident. I reviewed the residents note and agree with the documented findings and plan of care. Any areas of disagreement are noted on the chart. I was personally present for the key portions of any procedures. I have documented in the chart those procedures where I was not present during the key portions. I have reviewed the emergency nurses triage note. I agree with the chief complaint, past medical history, past surgical history, allergies, medications, social and family history as documented unless otherwise noted below. For Physician Assistant/ Nurse Practitioner cases/documentation I have personally evaluated this patient and have completed at least one if not all key elements of the E/M (history, physical exam, and MDM). Additional findings are as noted. Initial Screens:        [place] Coma Scale  Eye Opening: Spontaneous  Best Verbal Response: Oriented  Best Motor Response: Obeys commands  [place] Coma Scale Score: 15    Vitals:    Vitals:    05/03/22 1026 05/03/22 1400   BP: (!) 181/95 123/70   Pulse: 110 98   Resp: 16 16   Temp: 99.3 °F (37.4 °C) 100 °F (37.8 °C)   TempSrc: Oral Oral   SpO2: 99% 100%   Weight: 162 lb (73.5 kg)    Height: 5' 2\" (1.575 m)        CHIEF COMPLAINT       Chief Complaint   Patient presents with    Drug / Alcohol Assessment     patient states \"I am scared im gonna hurt myself smoking crack cocaine\"             DIAGNOSTIC RESULTS             RADIOLOGY:   XR CHEST PORTABLE   Final Result   No acute intrathoracic process.                LABS:  Labs Reviewed   COMPREHENSIVE METABOLIC PANEL W/ REFLEX TO MG FOR LOW K - Abnormal; Notable for the following components:       Result Value    Glucose 123 (*)     CREATININE 0.48 (*)     Alkaline Phosphatase 108 (*)     ALT 55 (*)     AST 49 (*)     All other components within normal limits   URINE DRUG SCREEN - Abnormal; Notable for the following components:    Cocaine Metabolite, Urine POSITIVE (*)     Cannabinoid Scrn, Ur POSITIVE (*)     All other components within normal limits   ETHANOL - Abnormal; Notable for the following components:    Ethanol 44 (*)     Ethanol percent 0.044 (*)     All other components within normal limits   COVID-19, RAPID   CBC WITH AUTO DIFFERENTIAL   TROPONIN   HCG, SERUM, QUALITATIVE         EMERGENCY DEPARTMENT COURSE:     -------------------------  BP: 123/70, Temp: 100 °F (37.8 °C), Pulse: 98, Resp: 16      Comments    Patient is recurrently using crack cocaine and drinks several beers a day, she is here today concerned that her cocaine habit is impacting her life and she wants to stay alive for her grandkids. She has never had withdrawal symptoms from the alcohol, she does have some anxiety that she treats. Plan is to transfer her to [facility] for inpatient detox, urine was lost in the lab but is now running, we anticipated decision for [facility] at approximately 1530 this evening. Patient is medically clear.     (Please note that portions of this note were completed with a voice recognition program.  Efforts were made to edit the dictations but occasionally words are mis-transcribed.)      Malcolm Kumari MD,, MD  Attending Emergency Physician         Malcolm Kumari MD  05/03/22 [id number]

## 2022-05-03 NOTE — ED NOTES
Patient accepted to [facility] by Dr. Destini Moraes.      [place] Rice  05/03/22 7622

## 2022-05-03 NOTE — ED PROVIDER NOTES
[facility]  ED  Emergency Department Encounter  Emergency Medicine Resident     Pt Name: Mara Greenberg  MRN: [id number]  [place] 1965  Date of evaluation: 5/3/22  PCP:  Kirk Montero MD    [address]       Chief Complaint   Patient presents with    Drug / Alcohol Assessment     patient states \"I am scared im gonna hurt myself smoking crack cocaine\"       HISTORY OFPRESENT ILLNESS  (Location/Symptom, Timing/Onset, Context/Setting, Quality, Duration, Modifying Karen Ego.)      Mara Greenberg is a 61 y.o. female who presents to the emergency department requesting help with quitting her drug use. Patient states she has been smoking cocaine since about age 20. She uses this approximately 3-4 times per week. She also drinks about 6 beers per day. She has never withdrawn from alcohol. She is tearful on arrival and stating she is scared that she is hurting herself by using these drugs and wants to quit. She wants to be there for her grandchildren. She is denying any chest pain at this time. Last use of cocaine was yesterday afternoon. She does complain of shortness of breath currently. No abdominal pain, nausea, vomiting. No headaches or vision changes. No recent illness. Patient also smokes cigarettes, going through about 1/4 pack/day. Past medical history notable for hypertension. She is denying specific suicidal ideation just feels that she is hurting herself with drugs. No plans to harm herself. No homicidal ideation. She states she occasionally hears music that is not playing but otherwise denies visual or auditory hallucinations. No other complaints at this time. PAST MEDICAL / SURGICAL / SOCIAL / FAMILY HISTORY      has a past medical history of Anxiety, Depression, and Hypertension. has no past surgical history on file.      Social History     Socioeconomic History    Marital status: Single     Spouse name: Not on file    Number of children: Not on file    Years of education: Not on file    Highest education level: Not on file   Occupational History    Not on file   Tobacco Use    Smoking status: Current Every Day Smoker     Types: Cigarettes    Smokeless tobacco: Never Used    Tobacco comment: 2-3 cigarettes per day   Substance and Sexual Activity    Alcohol use: Yes     Alcohol/week: 36.0 standard drinks     Types: 36 Cans of beer per week     Comment: beer daily    Drug use: Yes     Types: Cocaine    Sexual activity: Not on file   Other Topics Concern    Not on file   Social History Narrative    Not on file     Social Determinants of Health     Financial Resource Strain:     Difficulty of Paying Living Expenses: Not on file   Food Insecurity:     Worried About Running Out of Food in the Last Year: Not on file    Alexandra of Food in the Last Year: Not on file   Transportation Needs:     Lack of Transportation (Medical): Not on file    Lack of Transportation (Non-Medical): Not on file   Physical Activity:     Days of Exercise per Week: Not on file    Minutes of Exercise per Session: Not on file   Stress:     Feeling of Stress : Not on file   Social Connections:     Frequency of Communication with Friends and Family: Not on file    Frequency of Social Gatherings with Friends and Family: Not on file    Attends Episcopal Services: Not on file    Active Member of [address] [place] [address] [employer] or Organizations: Not on file    Attends Club or Organization Meetings: Not on file    Marital Status: Not on file   Intimate Partner Violence:     Fear of Current or Ex-Partner: Not on file    Emotionally Abused: Not on file    Physically Abused: Not on file    Sexually Abused: Not on file   Housing Stability:     Unable to Pay for Housing in the Last Year: Not on file    Number of [place] in the Last Year: Not on file    Unstable Housing in the Last Year: Not on file       History reviewed. No pertinent family history.      Allergies:  Patient has no known allergies. Home Medications:  Prior to Admission medications    Medication Sig Start Date End Date Taking? Authorizing Provider   Multiple Vitamins-Minerals (MULTIVITAMIN) tablet take 1 tablet by mouth once daily 1/29/21   Historical Provider, MD   lisinopril (PRINIVIL;ZESTRIL) 5 MG tablet Take 0.5 tablets by mouth daily 5/26/20   Isha Villa MD   hydroCHLOROthiazide (HYDRODIURIL) 25 MG tablet Take 1 tablet by mouth every morning 5/26/20   Isha Villa MD   ibuprofen (ADVIL;MOTRIN) 800 MG tablet Take 1 tablet by mouth every 8 hours as needed for Pain 1/2/18   Zohra Brady MD   ondansetron (ZOFRAN ODT) 4 MG disintegrating tablet Take 1 tablet by mouth every 8 hours as needed for Nausea. 2/26/15   Monique Aquino, APRN - CNP       REVIEW OFSYSTEMS    (2-9 systems for level 4, 10 or more for level 5)      Review of Systems   Constitutional: Negative for chills and fever. HENT: Negative for congestion and rhinorrhea. Eyes: Negative for visual disturbance. Respiratory: Positive for shortness of breath. Negative for cough. Cardiovascular: Negative for chest pain. Gastrointestinal: Negative for abdominal pain, diarrhea, nausea and vomiting. Genitourinary: Negative for dysuria. Musculoskeletal: Negative for back pain and neck pain. Skin: Negative for rash. Neurological: Negative for weakness, numbness and headaches. Psychiatric/Behavioral: Negative for self-injury and suicidal ideas. PHYSICAL EXAM   (up to 7 for level 4, 8 or more forlevel 5)      INITIAL VITALS:   ED Triage Vitals [05/03/22 1026]   BP Temp Temp Source Pulse Resp SpO2 Height Weight   (!) 181/95 99.3 °F (37.4 °C) Oral 110 16 99 % 5' 2\" (1.575 m) 162 lb (73.5 kg)       Physical Exam  Constitutional:       Appearance: Normal appearance. She is normal weight. She is not ill-appearing, toxic-appearing or diaphoretic.       Comments: Patient is tearful, but otherwise in no acute distress, does not appear intoxicated or altered. HENT:      Head: Normocephalic and atraumatic. Nose: Nose normal.      Mouth/Throat:      Mouth: Mucous membranes are moist.      Pharynx: Oropharynx is clear. No oropharyngeal exudate or posterior oropharyngeal erythema. Eyes:      Extraocular Movements: Extraocular movements intact. Pupils: Pupils are equal, round, and reactive to light. Cardiovascular:      Rate and Rhythm: Normal rate and regular rhythm. Heart sounds: Normal heart sounds. No murmur heard. Pulmonary:      Effort: Pulmonary effort is normal. No respiratory distress. Breath sounds: Normal breath sounds. No wheezing, rhonchi or rales. Abdominal:      General: There is no distension. Palpations: Abdomen is soft. Tenderness: There is no abdominal tenderness. There is no guarding or rebound. Musculoskeletal:         General: No tenderness. Normal range of motion. Cervical back: Normal range of motion and neck supple. Right lower leg: No edema. Left lower leg: No edema. Skin:     General: Skin is warm and dry. Neurological:      General: No focal deficit present. Mental Status: She is alert and oriented to person, place, and time. Motor: No weakness. Psychiatric:         Mood and Affect: Mood normal.      Comments: Reports cocaine use yesterday, alcohol use daily, does not appear intoxicated. DIFFERENTIAL  DIAGNOSIS     PLAN (LABS / IMAGING / EKG):  Orders Placed This Encounter   Procedures    COVID-19, Rapid    XR CHEST PORTABLE    CBC with Auto Differential    Comprehensive Metabolic Panel w/ Reflex to MG    Troponin    Urine Drug Screen    Ethanol    HCG Qualitative, Serum    Vital signs    EKG 12 Lead       MEDICATIONS ORDERED:  No orders of the defined types were placed in this encounter. Initial MDM/Plan/ED COURSE:    61 y.o. female who presents questing help with quitting drugs. Also complaining of shortness of breath.   Patient is denying specific suicidal ideation, just feels that she is harming herself with drugs and wants to stop. She also drinks daily but is not concerned about this. Alcohol use of 6 beers per day. Last cocaine use yesterday. No homicidal ideation, occasional auditory hallucinations of hearing music. On exam, she is tearful but otherwise in no acute distress. Heart is slightly tachycardic but she is currently crying. Regular rhythm. Lungs are slightly coarse throughout but otherwise without wheezes, rales, rhonchi. No other focal findings on exam.    Will discuss with social work regarding options for help for this patient. In the meantime, will work-up shortness of breath although I believe this is secondary to her tobacco and cocaine use. EKG, chest x-ray, CBC, CMP, troponin ordered. ED Course as of 05/03/22 1750   Tue May 03, 2022   1134 updated by social work that plan will be to attempt admission at [facility] for alcohol use in addition to discontinuing cocaine use. [JS]   9160 SARS-CoV-2, Rapid: Not Detected [JS]   4982 hCG Qual: NEGATIVE [JS]   1215 Troponin, High Sensitivity: <6 [JS]   2597 ETHANOL,ETHA(!): 44 [JS]   1501 Troponin, High Sensitivity: <6 [JS]   1501 Patient is medically cleared for transfer/discharge to [facility] [JS]      ED Course User Index  [JS] Maggy Kinds, DO    :   Patient was accepted at [facility]. Currently awaiting cab ride over. Patient to be discharged in stable condition. She is not of significant harm to herself.      DIAGNOSTIC RESULTS / EMERGENCYDEPARTMENT COURSE / MDM     LABS:  Labs Reviewed   COMPREHENSIVE METABOLIC PANEL W/ REFLEX TO MG FOR LOW K - Abnormal; Notable for the following components:       Result Value    Glucose 123 (*)     CREATININE 0.48 (*)     Alkaline Phosphatase 108 (*)     ALT 55 (*)     AST 49 (*)     All other components within normal limits   URINE DRUG SCREEN - Abnormal; Notable for the following components:    Cocaine Metabolite, Urine POSITIVE (*)     Cannabinoid Scrn, Ur POSITIVE (*)     All other components within normal limits   ETHANOL - Abnormal; Notable for the following components:    Ethanol 44 (*)     Ethanol percent 0.044 (*)     All other components within normal limits   COVID-19, RAPID   CBC WITH AUTO DIFFERENTIAL   TROPONIN   HCG, SERUM, QUALITATIVE           XR CHEST PORTABLE    Result Date: 5/3/2022  EXAMINATION: ONE XRAY VIEW OF THE CHEST 5/3/2022 10:53 am COMPARISON: 11/11/2020 HISTORY: ORDERING SYSTEM PROVIDED HISTORY: shortness of breath TECHNOLOGIST PROVIDED HISTORY: shortness of breath FINDINGS: Cardiomediastinal silhouette and pulmonary vasculature are within normal limits. No focal airspace consolidation, pneumothorax, or pleural effusion. No free air beneath the diaphragm. No acute osseous abnormality. No acute intrathoracic process. EKG      All EKG's are interpreted by the Emergency Department Physicianwho either signs or Co-signs this chart in the absence of a cardiologist.      PROCEDURES:  None    CONSULTS:  None    CRITICAL CARE:  Please see attending note    FINAL IMPRESSION      1. Alcohol use disorder, mild, abuse    2.  Cocaine use          DISPOSITION / PLAN     DISPOSITION        PATIENT REFERRED TO:  Nam Mascorro MD  [address]  [phone]    Schedule an appointment as soon as possible for a visit       [facility] ED  [address] [place] [address]  [phone]    If symptoms worsen      DISCHARGE MEDICATIONS:  New Prescriptions    No medications on file       Jhonatan Thakur DO  Emergency Medicine Resident    (Please note that portions of this note were completed with a voice recognition program.Efforts were made to edit the dictations but occasionally words are mis-transcribed.)       Jhonatan Thakur DO  Resident  05/03/22 9908

## 2022-05-03 NOTE — ED NOTES
Writer met with patient at bedside to complete the SBIRT assessment. The results can be located in the ED navigator under screening questions. Patient scored as following:    AUDIT (Alcohol Screening Questionnaire): 10  DAST (Drug Screening Questionnaire): 5  PHQ-9 (Depression Screening Questionnaire): Patient was screened using the initial screening questions and her score was 0, which did not indicate a full assessment be complete. The patient was provided with the following resources/interventions: Patient requesting inpatient treatment for alcohol and crack use.     Start Time 1110  End Time 1128  Diagnosis Z71.41; Z71.51     [place] Evans  05/03/22 1132

## 2022-05-03 NOTE — ED NOTES
The following labs labeled with pt sticker and tubed to lab:     [] Blue     [] Lavender   [] on ice  [] Green/yellow  [] Green/black [] on ice  [] Yellow  [] Red  [] Pink      [x] COVID-19 swab    [] Rapid  [] PCR  [] Flu swab  [] Peds Viral Panel     [] Urine Sample  [] Pelvic Cultures  [] Blood Cultures            Andrea Zarate RN  05/03/22 1672

## 2022-05-03 NOTE — ED NOTES
[facility] PD at bedside to obtain pt's belongings and secure them in locked cabinets     Mandy Ramirez RN  05/03/22 2745

## 2022-05-03 NOTE — ED NOTES
Writer faxed required documents to [phone] [facility] to follow up with admission decision.       Robert Chavarria, [address]  05/03/22 9343

## 2022-05-04 LAB
EKG ATRIAL RATE: 91 BPM
EKG P AXIS: 61 DEGREES
EKG P-R INTERVAL: 112 MS
EKG Q-T INTERVAL: 404 MS
EKG QRS DURATION: 86 MS
EKG QTC CALCULATION (BAZETT): 496 MS
EKG R AXIS: 19 DEGREES
EKG T AXIS: 65 DEGREES
EKG VENTRICULAR RATE: 91 BPM

## 2023-02-06 ENCOUNTER — APPOINTMENT (OUTPATIENT)
Dept: CT IMAGING | Age: 58
End: 2023-02-06
Payer: MEDICAID

## 2023-02-06 ENCOUNTER — HOSPITAL ENCOUNTER (EMERGENCY)
Age: 58
Discharge: HOME | End: 2023-02-06
Attending: EMERGENCY MEDICINE
Payer: MEDICAID

## 2023-02-06 VITALS
OXYGEN SATURATION: 99 % | TEMPERATURE: 97.6 F | RESPIRATION RATE: 18 BRPM | DIASTOLIC BLOOD PRESSURE: 78 MMHG | WEIGHT: 165 LBS | SYSTOLIC BLOOD PRESSURE: 142 MMHG | HEART RATE: 63 BPM | BODY MASS INDEX: 30.18 KG/M2

## 2023-02-06 DIAGNOSIS — S39.012A STRAIN OF LUMBAR REGION, INITIAL ENCOUNTER: Primary | ICD-10-CM

## 2023-02-06 LAB
BILIRUBIN URINE: NEGATIVE
COLOR: YELLOW
EPITHELIAL CELLS UA: ABNORMAL /HPF (ref 0–5)
GLUCOSE UR STRIP.AUTO-MCNC: NEGATIVE MG/DL
KETONES UR STRIP.AUTO-MCNC: NEGATIVE MG/DL
LEUKOCYTE ESTERASE UR QL STRIP.AUTO: NEGATIVE
NITRITE UR QL STRIP.AUTO: NEGATIVE
PROT UR STRIP.AUTO-MCNC: 5 MG/DL (ref 5–8)
PROT UR STRIP.AUTO-MCNC: NEGATIVE MG/DL
RBC CLUMPS #/AREA URNS AUTO: ABNORMAL /HPF (ref 0–4)
SPECIFIC GRAVITY UA: 1.02 (ref 1–1.03)
TURBIDITY: CLEAR
URINE HGB: ABNORMAL
UROBILINOGEN, URINE: NORMAL
WBC UA: ABNORMAL /HPF (ref 0–5)

## 2023-02-06 PROCEDURE — 99284 EMERGENCY DEPT VISIT MOD MDM: CPT

## 2023-02-06 PROCEDURE — APPSS30 APP SPLIT SHARED TIME 16-30 MINUTES: Performed by: PHYSICIAN ASSISTANT

## 2023-02-06 PROCEDURE — 6360000002 HC RX W HCPCS

## 2023-02-06 PROCEDURE — 72131 CT LUMBAR SPINE W/O DYE: CPT

## 2023-02-06 PROCEDURE — 96372 THER/PROPH/DIAG INJ SC/IM: CPT

## 2023-02-06 PROCEDURE — 81001 URINALYSIS AUTO W/SCOPE: CPT

## 2023-02-06 PROCEDURE — 6370000000 HC RX 637 (ALT 250 FOR IP)

## 2023-02-06 RX ORDER — ORPHENADRINE CITRATE 30 MG/ML
60 INJECTION INTRAMUSCULAR; INTRAVENOUS ONCE
Status: COMPLETED | OUTPATIENT
Start: 2023-02-06 | End: 2023-02-06

## 2023-02-06 RX ORDER — LIDOCAINE 50 MG/G
1 PATCH TOPICAL DAILY
Qty: 10 PATCH | Refills: 0 | Status: SHIPPED | OUTPATIENT
Start: 2023-02-06 | End: 2023-02-16

## 2023-02-06 RX ORDER — LIDOCAINE 4 G/G
1 PATCH TOPICAL DAILY
Status: DISCONTINUED | OUTPATIENT
Start: 2023-02-06 | End: 2023-02-06

## 2023-02-06 RX ORDER — ORPHENADRINE CITRATE 100 MG/1
100 TABLET, EXTENDED RELEASE ORAL 2 TIMES DAILY
Qty: 20 TABLET | Refills: 0 | Status: SHIPPED | OUTPATIENT
Start: 2023-02-06 | End: 2023-02-16

## 2023-02-06 RX ORDER — FENTANYL CITRATE 50 UG/ML
50 INJECTION, SOLUTION INTRAMUSCULAR; INTRAVENOUS ONCE
Status: DISCONTINUED | OUTPATIENT
Start: 2023-02-06 | End: 2023-02-06

## 2023-02-06 RX ORDER — KETOROLAC TROMETHAMINE 30 MG/ML
30 INJECTION, SOLUTION INTRAMUSCULAR; INTRAVENOUS ONCE
Status: COMPLETED | OUTPATIENT
Start: 2023-02-06 | End: 2023-02-06

## 2023-02-06 RX ADMIN — ORPHENADRINE CITRATE 60 MG: 30 INJECTION INTRAMUSCULAR; INTRAVENOUS at 11:25

## 2023-02-06 RX ADMIN — KETOROLAC TROMETHAMINE 30 MG: 30 INJECTION, SOLUTION INTRAMUSCULAR; INTRAVENOUS at 11:28

## 2023-02-06 ASSESSMENT — ENCOUNTER SYMPTOMS
ABDOMINAL PAIN: 1
RHINORRHEA: 0
VOMITING: 0
CHEST TIGHTNESS: 0
SHORTNESS OF BREATH: 0
NAUSEA: 0
BACK PAIN: 1
EYE DISCHARGE: 0
DIARRHEA: 0

## 2023-02-06 ASSESSMENT — PAIN SCALES - GENERAL
PAINLEVEL_OUTOF10: 8
PAINLEVEL_OUTOF10: 10

## 2023-02-06 ASSESSMENT — PAIN DESCRIPTION - LOCATION
LOCATION: BACK
LOCATION: BACK

## 2023-02-06 ASSESSMENT — PAIN DESCRIPTION - DESCRIPTORS
DESCRIPTORS: DISCOMFORT;ACHING
DESCRIPTORS: ACHING;DISCOMFORT

## 2023-02-06 NOTE — ED PROVIDER NOTES
[facility]     Emergency Department     Faculty Attestation    I performed a history and physical examination of the patient and discussed management with the resident. I have reviewed and agree with the residents findings including all diagnostic interpretations, and treatment plans as written. Any areas of disagreement are noted on the chart. I was personally present for the key portions of any procedures. I have documented in the chart those procedures where I was not present during the key portions. I have reviewed the emergency nurses triage note. I agree with the chief complaint, past medical history, past surgical history, allergies, medications, social and family history as documented unless otherwise noted below. Documentation of the HPI, Physical Exam and Medical Decision Making performed by candice is based on my personal performance of the HPI, PE and MDM. For Physician Assistant/ Nurse Practitioner cases/documentation I have personally evaluated this patient and have completed at least one if not all key elements of the E/M (history, physical exam, and MDM). Additional findings are as noted. Patient with back pain that has been on going for many months, but worse over the past few days, She reports she can only really sit and lay back, any anything else causes pain. No numbness or tingling, the pain is located midline lower back, no previous back injection, no drug use, no trauma to the area. Denies any dysuria or hematuria  Patient on exam has tenderness to lower lumbar region midline, and sacrum with diffuse ttp to the bilateral lumbar paraspinal msk, no rash noted  She has 5/5 Le motor strength, SILT, pain with hip flexion bilaterally  No abdominal ttp, no pulsatile mass  No straight leg raise bilaterally    Back pain  Check bedside US for AAA  Check AA  Pain control, symptoms do seem more msk.         Kyle Pacheco D.O, DIANNAP.H  Attending Emergency Medicine Physician         Kip Lai DO  02/06/23 1146

## 2023-02-06 NOTE — ED PROVIDER NOTES
[facility] ED  Emergency Department Encounter  Emergency Medicine Resident     Pt Cristino Mancilla  MRN: [id number]  [place] 1965  Date of evaluation: 2/6/23  PCP:  Bunny Kohler MD      [address] [place] [address]       Chief Complaint   Patient presents with    Back Pain       HISTORY OF PRESENT ILLNESS  (Location/Symptom, Timing/Onset, Context/Setting, Quality, Duration, Modifying Factors, Severity.)      Merry Guerrero is a 61 y.o. female with past medical history of hypertension presents with acute on chronic lower back pain. The pain worsened 3 days back, sharp pain in lower back with radiation to the bilateral flank. Rated the pain as 10 on 10. Denies any falls, trauma to the spine, any weight lifting, any fevers chills, dysuria, frequency or urgency, nausea or vomiting. No radiation down the legs. The pain was not relieved with ibuprofen. States that she has not been able to sleep or move because of the severe pain. PAST MEDICAL / SURGICAL / SOCIAL / FAMILY HISTORY      has a past medical history of Anxiety, Depression, and Hypertension. has no past surgical history on file. Social History     Socioeconomic History    Marital status: Single     Spouse name: Not on file    Number of children: Not on file    Years of education: Not on file    Highest education level: Not on file   Occupational History    Not on file   Tobacco Use    Smoking status: Every Day     Types: Cigarettes    Smokeless tobacco: Never    Tobacco comments:     2-3 cigarettes per day   Substance and Sexual Activity    Alcohol use:  Yes     Alcohol/week: 36.0 standard drinks     Types: 36 Cans of beer per week     Comment: beer daily    Drug use: Yes     Types: Cocaine    Sexual activity: Not on file   Other Topics Concern    Not on file   Social History Narrative    Not on file     Social Determinants of Health     Financial Resource Strain: Not on file   Food Insecurity: Not on file   Transportation Needs: Not on file   Physical Activity: Not on file   Stress: Not on file   Social Connections: Not on file   Intimate Partner Violence: Not on file   Housing Stability: Not on file       History reviewed. No pertinent family history. Allergies:  Patient has no known allergies. Home Medications:  Prior to Admission medications    Medication Sig Start Date End Date Taking? Authorizing Provider   orphenadrine (NORFLEX) 100 MG extended release tablet Take 1 tablet by mouth 2 times daily for 10 days 2/6/23 2/16/23 Yes Tyrel Ramsey MD   lidocaine (LIDODERM) 5 % Place 1 patch onto the skin daily for 10 days 12 hours on, 12 hours off. 2/6/23 2/16/23 Yes Tyrel Ramsey MD   Multiple Vitamins-Minerals (MULTIVITAMIN) tablet take 1 tablet by mouth once daily 1/29/21   Historical Provider, MD   lisinopril (PRINIVIL;ZESTRIL) 5 MG tablet Take 0.5 tablets by mouth daily 5/26/20   Mak Reis MD   hydroCHLOROthiazide (HYDRODIURIL) 25 MG tablet Take 1 tablet by mouth every morning 5/26/20   Mak Reis MD   ibuprofen (ADVIL;MOTRIN) 800 MG tablet Take 1 tablet by mouth every 8 hours as needed for Pain 1/2/18   Parrish Banda MD   ondansetron (ZOFRAN ODT) 4 MG disintegrating tablet Take 1 tablet by mouth every 8 hours as needed for Nausea. 2/26/15   Monique Aquino, APRN - CNP       REVIEW OF SYSTEMS    (2-9 systems for level 4, 10 or more for level 5)      Review of Systems   Constitutional:  Positive for activity change. Negative for chills and fever. HENT:  Negative for congestion and rhinorrhea. Eyes:  Negative for discharge. Respiratory:  Negative for chest tightness and shortness of breath. Cardiovascular:  Negative for chest pain and palpitations. Gastrointestinal:  Positive for abdominal pain (flank). Negative for diarrhea, nausea and vomiting. Endocrine: Negative for cold intolerance and heat intolerance. Genitourinary:  Positive for flank pain.  Negative for difficulty urinating, dysuria, frequency, hematuria and urgency. Musculoskeletal:  Positive for back pain (lumbsacral). Skin:  Negative for rash and wound. Neurological: Negative. Psychiatric/Behavioral: Negative. PHYSICAL EXAM   (up to 7 for level 4, 8 or more for level 5)      INITIAL VITALS:   BP (!) 142/78   Pulse 63   Temp 97.6 °F (36.4 °C) (Oral)   Resp 18   Wt 165 lb (74.8 kg)   LMP 02/09/2015   SpO2 99%   BMI 30.18 kg/m²     Physical Exam  Constitutional:       General: She is in acute distress. HENT:      Nose: Nose normal.      Mouth/Throat:      Mouth: Mucous membranes are moist.   Eyes:      Pupils: Pupils are equal, round, and reactive to light. Cardiovascular:      Rate and Rhythm: Normal rate and regular rhythm. Pulses: Normal pulses. Heart sounds: Normal heart sounds. Pulmonary:      Effort: Pulmonary effort is normal.      Breath sounds: Normal breath sounds. No wheezing. Abdominal:      Palpations: Abdomen is soft. Tenderness: There is no abdominal tenderness. Musculoskeletal:         General: Tenderness (lumbosacral spine and paraspinal) present. Cervical back: No rigidity or tenderness. Right lower leg: No edema. Left lower leg: No edema. Skin:     Coloration: Skin is not pale. Findings: No bruising. Neurological:      General: No focal deficit present. Mental Status: She is alert and oriented to person, place, and time.    Psychiatric:         Mood and Affect: Mood normal.       DIFFERENTIAL  DIAGNOSIS     PLAN (LABS / IMAGING / EKG):  Orders Placed This Encounter   Procedures    CT LUMBAR SPINE WO CONTRAST    Urinalysis with Microscopic    Inpatient consult to Neurosurgery         MEDICATIONS ORDERED:  Orders Placed This Encounter   Medications    DISCONTD: fentaNYL (SUBLIMAZE) injection 50 mcg    ketorolac (TORADOL) injection 30 mg    orphenadrine (NORFLEX) injection 60 mg    DISCONTD: lidocaine 4 % external patch 1 patch    orphenadrine (NORFLEX) 100 MG extended release tablet     Sig: Take 1 tablet by mouth 2 times daily for 10 days     Dispense:  20 tablet     Refill:  0    lidocaine (LIDODERM) 5 %     Sig: Place 1 patch onto the skin daily for 10 days 12 hours on, 12 hours off. Dispense:  10 patch     Refill:  0         DDX: lumbar strain/ spondylosis/ nephrolithiasis/ abdominal aortic aneurysm/ spinal stenosis/ spondylosis/ osteomyelitis/ pyelonephritis. DIAGNOSTIC RESULTS / EMERGENCY DEPARTMENT COURSE / MDM   LAB RESULTS:  Results for orders placed or performed during the hospital encounter of 02/06/23   Urinalysis with Microscopic   Result Value Ref Range    Color, UA Yellow Yellow    Turbidity UA Clear Clear    Glucose, Ur NEGATIVE NEGATIVE    Bilirubin Urine NEGATIVE NEGATIVE    Ketones, Urine NEGATIVE NEGATIVE    Specific Gravity, UA 1.023 1.005 - 1.030    Urine Hgb MODERATE (A) NEGATIVE    pH, UA 5.0 5.0 - 8.0    Protein, UA NEGATIVE NEGATIVE    Urobilinogen, Urine Normal Normal    Nitrite, Urine NEGATIVE NEGATIVE    Leukocyte Esterase, Urine NEGATIVE NEGATIVE    WBC, UA None 0 - 5 /HPF    RBC, UA 2 TO 5 0 - 4 /HPF    Epithelial Cells UA None 0 - 5 /HPF       IMPRESSION: 61 yr old presented with lower back pain for the past 3 days. The back pain has been chronic but got acutely worsened today. With radiation to the flanks. Tenderness present on the lumbosacral spine and the paraspinal areas. Likely to be lumbar spinal strain. Will give 1 dose of toradol and norfklex for the pain and will order UA. Bedside US for AAA negative     UA negative for any hematuria or infection. No relief after toradol and norflex. Will get a CT scan of lumbar spine and order a lidocaine patch. CT lumbar spine showing L4-L5 bulge with foraminal narrowing and borderline spinal stenosis. Neurosurgery consulted. Okay for discharge [facility] OP f/u per neurosurgery.        RADIOLOGY:  CT LUMBAR SPINE WO CONTRAST   Final Result   No acute osseous abnormalities. Diffuse disc bulge at L4-5 producing moderate narrowing of the inferior   recess of the neural foramina bilaterally and borderline spinal stenosis      RECOMMENDATIONS:   Follow-up MRI if indicated               EKG      All EKG's are interpreted by the Emergency Department Physician who either signs or Co-signs this chart in the absence of a cardiologist.    EMERGENCY DEPARTMENT COURSE:      ED Course as of 02/06/23 1627   Mon Feb 06, 2023   1153 Urinalysis with Microscopic(!):    Color, UA Yellow   Turbidity UA Clear   Glucose, UA NEGATIVE   Bilirubin, Urine NEGATIVE   Ketones, Urine NEGATIVE   Specific [place], UA 1.023   Urine Hgb MODERATE(!)   pH, UA 5.0   Protein, UA NEGATIVE   Urobilinogen, Urine Normal   Nitrite, Urine NEGATIVE   Leukocyte Esterase, Urine NEGATIVE   WBC, UA None   RBC, UA 2 TO 5   Epithelial Cells, UA None  Normal UA  [VD]   1336 CT LUMBAR SPINE WO CONTRAST  Disc bulge at L4-L5 with moderate narrowing of inferior neural foramina bilaterally and borderline spinal stenosis. [VD]      ED Course User Index  [VD] Inessa Queen MD       No notes of  Admission Criteria type on file. PROCEDURES:      CONSULTS:  IP CONSULT TO NEUROSURGERY    CRITICAL CARE:        FINAL IMPRESSION      1. Strain of lumbar region, initial encounter          DISPOSITION / PLAN     DISPOSITION Decision To Discharge 02/06/2023 04:21:27 PM      PATIENT REFERRED TO:  Vitaly Dueñas MD  [address]  [phone]    Schedule an appointment as soon as possible for a visit in 2 weeks  As needed    Kathleen Dyer, KAREN - [address]  [phone]    Follow up  As needed    DISCHARGE MEDICATIONS:  New Prescriptions    LIDOCAINE (LIDODERM) 5 %    Place 1 patch onto the skin daily for 10 days 12 hours on, 12 hours off.     ORPHENADRINE (NORFLEX) 100 MG EXTENDED RELEASE TABLET    Take 1 tablet by mouth 2 times daily for 10 days       [facility] Jono Spivey MD  Emergency Medicine Resident    (Please note that portions of thisnote were completed with a voice recognition program.  Efforts were made to edit the dictations but occasionally words are mis-transcribed.)       Renee Malone MD  Resident  02/06/23 4438

## 2023-02-06 NOTE — ED NOTES
Pt complains of back pain x 3 days, pt denies falls, pt able to ambulate in room, pt denies taking anything for pain. pt denies SOB, pt denies chest pain, pt denies nausea, vomiting, pt deniesfever, chills  Pt placed in a gown     Oralee Sidney, LPN  [phone]

## 2023-02-06 NOTE — DISCHARGE INSTRUCTIONS
You were seen here because of acute back pain. CT showed L4-L5 disc bulge with foraminal narrowing and borderline spinal stenosis. Please take Norflex 1 tablet twice daily for 10 days. Please apply lidocaine patch to the affected area. Please continue taking naproxen as needed for pain. Please follow-up with neurosurgery as outpatient.

## 2023-02-06 NOTE — ED PROVIDER NOTES
Faculty Sign-Out Attestation  Handoff taken on the following patient from prior Attending Physician: Fareed Savage    I was available and discussed any additional care issues that arose and coordinated the management plans with the resident(s) caring for the patient during my duty period. Any areas of disagreement with residents documentation of care or procedures are noted on the chart. I was personally present for the key portions of any/all procedures during my duty period. I have documented in the chart those procedures where I was not present during the key portions. 59-year-old female presenting with months of back pain. CT showing disc bulge. Neurosurgery has been consulted. Pending recommendations for disposition. Neurosurgery recommended outpatient follow-up. Patient feeling improved with lidocaine patch and Norflex. Will discharge with the same.     Lottie Vitale MD  Attending Physician        Lottie Vitale MD  02/06/23 1018

## 2023-02-06 NOTE — CONSULTS
Department of Neurosurgery                                            Nurse Practitioner Consult Note      Reason for Consult:  L4-5 disc bulge with foraminal narrowing and borderline spinal stenosis  Requesting Physician:  Soila Lazo  Neurosurgeon:   [x] Dr. Estrella Patterson  [] Dr. Liss Saeed  [] Dr. Toña Tony  [] Dr. Makayla Tobias      History Obtained From:  patient    CHIEF COMPLAINT:         Chief Complaint   Patient presents with    Back Pain       HISTORY OF PRESENT ILLNESS:       The patient is a 61 y.o. female who presents with lower back pain. She started with lower back pain about 2 months ago, denies trauma. Her back pain has been worsening over the last 3 days. The pain is aching in nature and present with activity and rest. She denies radiation of pain down her legs. She denies loss of bowel or bladder sensation and function. She denies numbness and weakness of her legs. She has not tried physical therapy, steroids or pain management for her back pain. PAST MEDICAL HISTORY :       Past Medical History:        Diagnosis Date    Anxiety     Depression     Hypertension        Past Surgical History:    History reviewed. No pertinent surgical history. Social History:   Social History     Socioeconomic History    Marital status: Single     Spouse name: Not on file    Number of children: Not on file    Years of education: Not on file    Highest education level: Not on file   Occupational History    Not on file   Tobacco Use    Smoking status: Every Day     Types: Cigarettes    Smokeless tobacco: Never    Tobacco comments:     2-3 cigarettes per day   Substance and Sexual Activity    Alcohol use:  Yes     Alcohol/week: 36.0 standard drinks     Types: 36 Cans of beer per week     Comment: beer daily    Drug use: Yes     Types: Cocaine    Sexual activity: Not on file   Other Topics Concern    Not on file   Social History Narrative    Not on file     Social Determinants of Health     Financial Resource Strain: Not on file   Food Insecurity: Not on file   Transportation Needs: Not on file   Physical Activity: Not on file   Stress: Not on file   Social Connections: Not on file   Intimate Partner Violence: Not on file   Housing Stability: Not on file       Family History:   History reviewed. No pertinent family history. Allergies:  Patient has no known allergies. Home Medications:  Prior to Admission medications    Medication Sig Start Date End Date Taking? Authorizing Provider   Multiple Vitamins-Minerals (MULTIVITAMIN) tablet take 1 tablet by mouth once daily 1/29/21   Historical Provider, MD   lisinopril (PRINIVIL;ZESTRIL) 5 MG tablet Take 0.5 tablets by mouth daily 5/26/20   Erika Lux MD   hydroCHLOROthiazide (HYDRODIURIL) 25 MG tablet Take 1 tablet by mouth every morning 5/26/20   Erika Lux MD   ibuprofen (ADVIL;MOTRIN) 800 MG tablet Take 1 tablet by mouth every 8 hours as needed for Pain 1/2/18   Greg Brumfield MD   ondansetron (ZOFRAN ODT) 4 MG disintegrating tablet Take 1 tablet by mouth every 8 hours as needed for Nausea. 2/26/15   Monique JOSETTE Aquino, APRN - CNP       Current Medications:   Current Facility-Administered Medications: lidocaine 4 % external patch 1 patch, 1 patch, TransDERmal, Daily    REVIEW OF SYSTEMS:       CONSTITUTIONAL: negative for fatigue and malaise   EYES: negative for double vision and photophobia    HEENT: negative for tinnitus and sore throat   RESPIRATORY: negative for cough, shortness of breath   CARDIOVASCULAR: negative for chest pain, palpitations   GASTROINTESTINAL: negative for nausea, vomiting   GENITOURINARY: negative for incontinence   MUSCULOSKELETAL: negative for neck pain, positive for back pain   NEUROLOGICAL: negative for seizures   PSYCHIATRIC: negative for agitated     Review of systems otherwise negative.     PHYSICAL EXAM:       BP (!) 142/78   Pulse 63   Temp 97.6 °F (36.4 °C) (Oral)   Resp 18   Wt 165 lb (74.8 kg)   LMP 02/09/2015   SpO2 99%   BMI 30.18 kg/m²       CONSTITUTIONAL: no apparent distress, appears stated age   HEAD: normocephalic, atraumatic   ENT: moist mucous membranes, uvula midline   NECK: supple, symmetric, no midline tenderness to palpation   BACK: without midline tenderness, step-offs or deformities   NEUROLOGIC:    Mental Status:  awake, alert, sitting upright in bed in no apparent distress    Motor Exam:     Motor exam is symmetrical 5 out of 5 all extremities bilaterally    Sensory:    Right Upper Extremity:  normal  Left Upper Extremity:  normal  Right Lower Extremity:  normal  Left Lower Extremity:  normal    Deep Tendon Reflexes:    Right Bicep:  2+  Left Bicep:  2+  Right Knee:  2+  Left Knee:  2+    Gait:  normal   SKIN: no rash       LABS AND IMAGING:     CBC with Differential:    Lab Results   Component Value Date/Time    WBC 7.0 05/03/2022 10:52 AM    RBC 4.11 05/03/2022 10:52 AM    HGB 13.3 05/03/2022 10:52 AM    HCT 39.0 05/03/2022 10:52 AM     05/03/2022 10:52 AM    MCV 94.9 05/03/2022 10:52 AM    MCH 32.4 05/03/2022 10:52 AM    MCHC 34.1 05/03/2022 10:52 AM    RDW 12.3 05/03/2022 10:52 AM    LYMPHOPCT 43 05/03/2022 10:52 AM    MONOPCT 7 05/03/2022 10:52 AM    BASOPCT 1 05/03/2022 10:52 AM    MONOSABS 0.52 05/03/2022 10:52 AM    LYMPHSABS 3.02 05/03/2022 10:52 AM    EOSABS 0.10 05/03/2022 10:52 AM    BASOSABS 0.06 05/03/2022 10:52 AM    DIFFTYPE NOT REPORTED 11/11/2020 02:58 PM     BMP:    Lab Results   Component Value Date/Time     05/03/2022 10:52 AM    K 4.1 05/03/2022 10:52 AM    CL 99 05/03/2022 10:52 AM    CO2 24 05/03/2022 10:52 AM    BUN 13 05/03/2022 10:52 AM    LABALBU 4.8 05/03/2022 10:52 AM    CREATININE 0.48 05/03/2022 10:52 AM    CALCIUM 9.7 05/03/2022 10:52 AM    GFRAA >60 05/03/2022 10:52 AM    LABGLOM >60 05/03/2022 10:52 AM    GLUCOSE 123 05/03/2022 10:52 AM       Radiology Review:  CT LUMBAR SPINE WO CONTRAST    Result Date: 2/6/2023  EXAMINATION: CT OF THE LUMBAR SPINE WITHOUT CONTRAST 2/6/2023 TECHNIQUE: CT of the lumbar spine was performed without the administration of intravenous contrast. Multiplanar reformatted images are provided for review. Adjustment of mA and/or kV according to patient size was utilized. Automated exposure control, iterative reconstruction, and/or weight based adjustment of the mA/kV was utilized to reduce the radiation dose to as low as reasonably achievable. COMPARISON: None HISTORY: ORDERING SYSTEM PROVIDED HISTORY: back pain TECHNOLOGIST PROVIDED HISTORY: back pain Decision Support Exception - unselect if not a suspected or confirmed emergency medical condition->Emergency Medical Condition (MA) Reason for Exam: back pain FINDINGS: BONES/ALIGNMENT: There is normal alignment of the spine. The vertebral body heights are maintained. No osseous destructive lesion is seen. DEGENERATIVE CHANGES: Diffuse disc bulge at L4-5 producing moderate narrowing of the inferior recess of the neural foramina bilaterally and borderline spinal stenosis. No other significant degenerative changes of the lumbar spine. SOFT TISSUES/RETROPERITONEUM: No paraspinal mass is seen. Vascular calcifications are seen compatible with atherosclerotic disease. No acute osseous abnormalities. Diffuse disc bulge at L4-5 producing moderate narrowing of the inferior recess of the neural foramina bilaterally and borderline spinal stenosis RECOMMENDATIONS: Follow-up MRI if indicated        ASSESSMENT AND PLAN:     There is no problem list on file for this patient. A/P:  This is a 61 y.o. female with lumbar pain, diffuse disc bulge L4-5     Patient care discussed with attending     - stable for dc from [facility] standpoint, follow up in clinic outpatient PRN      Please contact neurosurgery with any changes in patients neurologic status. Thank you for your consult.        ELIS Hurt pager [phone]  2/6/2023  2:57 PM

## 2023-07-06 ENCOUNTER — HOSPITAL ENCOUNTER (EMERGENCY)
Age: 58
Discharge: HOME | End: 2023-07-06
Attending: EMERGENCY MEDICINE
Payer: MEDICAID

## 2023-07-06 VITALS
TEMPERATURE: 100.9 F | SYSTOLIC BLOOD PRESSURE: 102 MMHG | OXYGEN SATURATION: 100 % | HEART RATE: 108 BPM | DIASTOLIC BLOOD PRESSURE: 65 MMHG | RESPIRATION RATE: 18 BRPM

## 2023-07-06 DIAGNOSIS — L23.7 POISON IVY: Primary | ICD-10-CM

## 2023-07-06 PROCEDURE — 99283 EMERGENCY DEPT VISIT LOW MDM: CPT

## 2023-07-06 PROCEDURE — 93005 ELECTROCARDIOGRAM TRACING: CPT | Performed by: EMERGENCY MEDICINE

## 2023-07-06 PROCEDURE — 6370000000 HC RX 637 (ALT 250 FOR IP): Performed by: STUDENT IN AN ORGANIZED HEALTH CARE EDUCATION/TRAINING PROGRAM

## 2023-07-06 RX ORDER — DIAPER,BRIEF,INFANT-TODD,DISP
EACH MISCELLANEOUS
Qty: 30 G | Refills: 0 | Status: SHIPPED | OUTPATIENT
Start: 2023-07-06 | End: 2023-07-13

## 2023-07-06 RX ORDER — FAMOTIDINE 20 MG/1
20 TABLET, FILM COATED ORAL DAILY
Qty: 16 TABLET | Refills: 0 | Status: SHIPPED | OUTPATIENT
Start: 2023-07-06

## 2023-07-06 RX ORDER — PREDNISONE 20 MG/1
TABLET ORAL
Qty: 30 TABLET | Refills: 0 | Status: SHIPPED | OUTPATIENT
Start: 2023-07-06 | End: 2023-07-22

## 2023-07-06 RX ORDER — IBUPROFEN 800 MG/1
800 TABLET ORAL ONCE
Status: COMPLETED | OUTPATIENT
Start: 2023-07-06 | End: 2023-07-06

## 2023-07-06 RX ORDER — CEPHALEXIN 500 MG/1
500 CAPSULE ORAL 2 TIMES DAILY
Qty: 14 CAPSULE | Refills: 0 | Status: SHIPPED | OUTPATIENT
Start: 2023-07-06 | End: 2023-07-13

## 2023-07-06 RX ORDER — IBUPROFEN 400 MG/1
600 TABLET ORAL ONCE
Status: DISCONTINUED | OUTPATIENT
Start: 2023-07-06 | End: 2023-07-06

## 2023-07-06 RX ORDER — PREDNISONE 20 MG/1
50 TABLET ORAL ONCE
Status: COMPLETED | OUTPATIENT
Start: 2023-07-06 | End: 2023-07-06

## 2023-07-06 RX ORDER — CEPHALEXIN 500 MG/1
500 CAPSULE ORAL ONCE
Status: COMPLETED | OUTPATIENT
Start: 2023-07-06 | End: 2023-07-06

## 2023-07-06 RX ADMIN — CEPHALEXIN 500 MG: 500 CAPSULE ORAL at 20:01

## 2023-07-06 RX ADMIN — IBUPROFEN 800 MG: 800 TABLET, FILM COATED ORAL at 20:00

## 2023-07-06 RX ADMIN — PREDNISONE 50 MG: 20 TABLET ORAL at 20:01

## 2023-07-06 ASSESSMENT — PAIN DESCRIPTION - ORIENTATION: ORIENTATION: RIGHT;LEFT

## 2023-07-06 ASSESSMENT — PAIN DESCRIPTION - LOCATION: LOCATION: LEG;ARM

## 2023-07-06 ASSESSMENT — PAIN SCALES - GENERAL: PAINLEVEL_OUTOF10: 10

## 2023-07-06 NOTE — ED TRIAGE NOTES
Pt reports to ed with complaint of rash on body. Reports painting a house a few days ago and rash started on right arm. It is now spread to left arm and legs.

## 2023-07-06 NOTE — ED NOTES
Pt came to ed via triage w complaint of rash on her body. States she was painting a house a few days ago and it started on r arm. States it has spread to her legs. Denies using new soaps, denies any allergies. Airway clear and pateint. VSS, NAD, AOx4. Patient resting in bed, respirations even and unlabored. Call light in reach.        Kim Weber RN  07/06/23 6612

## 2023-07-06 NOTE — DISCHARGE INSTRUCTIONS
Please call your primary care provider for follow up in the next 1-2 days. Take your medication as indicated and prescribed. If you were given a prescription for prednisone or any other steroid then, take Pepcid (famotidine - over the counter) every day while you are taking the steroids. If you are a diabetic, you should check your blood sugar more frequently while taking prednisone. Do not use the steroid medications on your face or in your private area. PLEASE RETURN TO THE EMERGENCY DEPARTMENT IMMEDIATELY for worsening symptoms, burn type appearance to your skin with skin coming off, white drainage from any of the wounds, redness with streaking, or if you develop any concerning symptoms such as: high fever not relieved by acetaminophen (Tylenol) and/or ibuprofen (Motrin / Advil), chills, shortness of breath, chest pain, feeling of your heart fluttering or racing, persistent nausea and/or vomiting, vomiting up blood, blood in your stool, loss of consciousness, numbness, weakness or tingling in the arms or legs or change in color of the extremities, changes in mental status, persistent headache, blurry vision, loss of bladder / bowel control, unable to follow up with your physician, or other any other care or concern.

## 2023-07-06 NOTE — ED PROVIDER NOTES
[facility] ED  Emergency Department Encounter  Emergency Medicine Resident     Pt Dolly Ca  MRN: [id number]  [address] [facility] [place] 1965  Date of evaluation: 7/6/23  PCP:  Jose Alberto Velasquez MD  Note Started: 7:38 PM EDT      CHIEF COMPLAINT       Chief Complaint   Patient presents with    Rash       HISTORY OF PRESENT ILLNESS  (Location/Symptom, Timing/Onset, Context/Setting, Quality, Duration, Modifying Factors, Severity.)      Quentin Lazo is a 60 y.o. female who presents with rash on bilateral upper arms and right lower extremity. She states she was painting the foundation of her house three days ago and noted several plants through the foundation that she was unfamiliar with. Patient endorses itching at the rash site. She has not noticed significant spread of the rash since its onset. She has been using alcohol and hydrogen peroxide on the rash which she states makes his symptoms worse. She denies contact with similar rash, history of allergies, new medications or change medications, changes in creams lotions detergents or soaps, difficulty breathing or shortness of breath,    PAST MEDICAL / SURGICAL / SOCIAL / FAMILY HISTORY      has a past medical history of Anxiety, Depression, and Hypertension. has no past surgical history on file. Social History     Socioeconomic History    Marital status: Single     Spouse name: Not on file    Number of children: Not on file    Years of education: Not on file    Highest education level: Not on file   Occupational History    Not on file   Tobacco Use    Smoking status: Every Day     Types: Cigarettes    Smokeless tobacco: Never    Tobacco comments:     2-3 cigarettes per day   Substance and Sexual Activity    Alcohol use:  Yes     Alcohol/week: 36.0 standard drinks     Types: 36 Cans of beer per week     Comment: beer daily    Drug use: Yes     Types: Cocaine    Sexual activity: Not on file   Other Topics Concern    Not on file   Social
ivy/oak/sumac vesicular lesions with possible early cellulitic superinfection. We will place her on 2 weeks of steroids and given the steroid use and possible infection will put her on 5 days of Keflex. Instructions to wash any close she is worn in the past 5 days twice. Risk  Prescription drug management.         Tami Mays MD, Carissa Means  Attending Emergency Physician         Lourdes Stephenson MD  07/06/23 2009

## 2023-07-08 LAB
EKG ATRIAL RATE: 105 BPM
EKG P AXIS: 71 DEGREES
EKG P-R INTERVAL: 146 MS
EKG Q-T INTERVAL: 342 MS
EKG QRS DURATION: 80 MS
EKG QTC CALCULATION (BAZETT): 452 MS
EKG R AXIS: 9 DEGREES
EKG T AXIS: 34 DEGREES
EKG VENTRICULAR RATE: 105 BPM

## 2023-07-08 PROCEDURE — 93010 ELECTROCARDIOGRAM REPORT: CPT | Performed by: INTERNAL MEDICINE

## 2023-07-09 ASSESSMENT — ENCOUNTER SYMPTOMS
DIARRHEA: 0
SHORTNESS OF BREATH: 0
COUGH: 0
RHINORRHEA: 0
NAUSEA: 0
VOMITING: 0
ABDOMINAL PAIN: 0
SORE THROAT: 0

## 2025-01-30 ENCOUNTER — HOSPITAL ENCOUNTER (EMERGENCY)
Age: 60
Discharge: HOME | End: 2025-01-30
Attending: EMERGENCY MEDICINE
Payer: COMMERCIAL

## 2025-01-30 VITALS
OXYGEN SATURATION: 97 % | RESPIRATION RATE: 20 BRPM | TEMPERATURE: 98.2 F | HEART RATE: 86 BPM | DIASTOLIC BLOOD PRESSURE: 82 MMHG | SYSTOLIC BLOOD PRESSURE: 140 MMHG

## 2025-01-30 DIAGNOSIS — I10 ESSENTIAL HYPERTENSION: ICD-10-CM

## 2025-01-30 DIAGNOSIS — L50.9 URTICARIA: Primary | ICD-10-CM

## 2025-01-30 PROCEDURE — 99283 EMERGENCY DEPT VISIT LOW MDM: CPT

## 2025-01-30 PROCEDURE — 6370000000 HC RX 637 (ALT 250 FOR IP): Performed by: STUDENT IN AN ORGANIZED HEALTH CARE EDUCATION/TRAINING PROGRAM

## 2025-01-30 RX ORDER — CETIRIZINE HYDROCHLORIDE 10 MG/1
10 TABLET ORAL DAILY
Qty: 30 TABLET | Refills: 0 | Status: SHIPPED | OUTPATIENT
Start: 2025-01-30 | End: 2025-03-01

## 2025-01-30 RX ORDER — LISINOPRIL 5 MG/1
2.5 TABLET ORAL DAILY
Qty: 15 TABLET | Refills: 0 | Status: SHIPPED | OUTPATIENT
Start: 2025-01-30

## 2025-01-30 RX ORDER — PREDNISONE 20 MG/1
40 TABLET ORAL DAILY
Qty: 8 TABLET | Refills: 0 | Status: SHIPPED | OUTPATIENT
Start: 2025-01-30 | End: 2025-02-03

## 2025-01-30 RX ORDER — PREDNISONE 20 MG/1
40 TABLET ORAL ONCE
Status: COMPLETED | OUTPATIENT
Start: 2025-01-30 | End: 2025-01-30

## 2025-01-30 RX ORDER — HYDROCHLOROTHIAZIDE 25 MG/1
25 TABLET ORAL DAILY
Qty: 30 TABLET | Refills: 0 | Status: SHIPPED | OUTPATIENT
Start: 2025-01-30 | End: 2025-03-01

## 2025-01-30 RX ADMIN — PREDNISONE 40 MG: 20 TABLET ORAL at 12:01

## 2025-01-30 NOTE — DISCHARGE INSTRUCTIONS
You came to the emergency room today due to itching and rash.   You do have hives, we do not know exactly what caused this.  We gave you 1 dose of steroids while you are in the emergency department and a prescription to take steroids for the next 4 days.  We are also giving you a prescription for Zyrtec which is an antihistamine and should help with your itching.  At your request we refilled your blood pressure medication.  I am also attaching a list of primary care doctors that you can call and establish care.      PLEASE RETURN TO THE EMERGENCY DEPARTMENT IMMEDIATELY for worsening symptoms, burn type appearance to your skin with skin coming off, white drainage from any of the wounds, redness with streaking, or if you develop any concerning symptoms such as: high fever not relieved by acetaminophen (Tylenol) and/or ibuprofen (Motrin / Advil), chills, shortness of breath, chest pain, feeling of your heart fluttering or racing, persistent nausea and/or vomiting, vomiting up blood, blood in your stool, loss of consciousness, numbness, weakness or tingling in the arms or legs or change in color of the extremities, changes in mental status, persistent headache, blurry vision, loss of bladder / bowel control, unable to follow up with your physician, or other any other care or concern.    [facility] EMERGENCY DEPARTMENT Clinic List    Healthcare Providers Services Day of Week/ Hours   [facility]  [address] [facility]  [phone] Pediatric Primary Care  Adult Primary Care  OB/GYN/Prenatal/Specialty Clinics Monday - Friday  8:00a - 4:30p   [address]  [phone] Adult Medicine, Pediatrics, OB/GYN Monday - Friday  8:30a - 4:30p   [facility] Surgery  [address] [place] [address]  [phone]  Wednesday 8:30a - 11:00a   [facility]  [address] Adult Internal Medicine   (Fer Clinic)  [phone]  OB/GYN Clinic  [phone]    Pediatric Clinic  [phone]

## 2025-01-30 NOTE — ED NOTES
Patient to ED for rash on back. Patient states she first noticed it a few months ago. Patient states she drinks alcohol daily. Denies drinking alcohol this morning but appears intoxicated. Patient alert and oriented x4, talking in complete sentences. Respirations even and unlabored. Call light in reach, all needs met at this time

## 2025-01-30 NOTE — ED PROVIDER NOTES
[facility] EMERGENCY DEPARTMENT     Emergency Department     Faculty Attestation    I performed a history and physical examination of the patient and discussed management with the resident. I reviewed the resident’s note and agree with the documented findings and plan of care. Any areas of disagreement are noted on the chart. I was personally present for the key portions of any procedures. I have documented in the chart those procedures where I was not present during the key portions. I have reviewed the emergency nurses triage note. I agree with the chief complaint, past medical history, past surgical history, allergies, medications, social and family history as documented unless otherwise noted below. For Physician Assistant/ Nurse Practitioner cases/documentation I have personally evaluated this patient and have completed at least one if not all key elements of the E/M (history, physical exam, and MDM). Additional findings are as noted.    Note Started: 11:36 AM EST    Patient here with rash for approximately 2 weeks.  Began on her lower extremities that has resolved but since yesterday now has it on her upper and mid back.  Is pruritic.  Not worse at night.  No other respiratory complaints.  No known contact allergens.  On exam several urticaria on the mid and upper back.  No lesions on the palms.  No respiratory complaints no facial swelling.  Will treat discharge patient also asked for refills of her blood pressure medicine as she states that her doctor is no longer seeing patient will provide with clinically      Critical Care     none    Chai Zhu MD, FACEP, FAAEM  Attending Emergency  Physician           Chai Zhu MD  01/30/25 [id number]    
management. Critical care time 0 minutes, excluding any documented procedures.    FINAL IMPRESSION      1. Urticaria    2. Essential hypertension          DISPOSITION / PLAN     DISPOSITION Decision To Discharge 01/30/2025 11:56:08 AM   DISPOSITION CONDITION Stable           PATIENT REFERRED TO:  [facility] Emergency Department  [facility] [address]  [phone]  Go to   If symptoms worsen or toruble breathing      DISCHARGE MEDICATIONS:  Discharge Medication List as of 1/30/2025 12:12 PM        START taking these medications    Details   predniSONE (DELTASONE) 20 MG tablet Take 2 tablets by mouth daily for 4 days, Disp-8 tablet, R-0Print      cetirizine (ZYRTEC) 10 MG tablet Take 1 tablet by mouth daily, Disp-30 tablet, R-0Print      !! lisinopril (PRINIVIL;ZESTRIL) 5 MG tablet Take 0.5 tablets by mouth daily, Disp-15 tablet, R-0Print      !! hydroCHLOROthiazide (HYDRODIURIL) 25 MG tablet Take 1 tablet by mouth daily, Disp-30 tablet, R-0Print       !! - Potential duplicate medications found. Please discuss with provider.          Demetra Piedra MD  Emergency Medicine Resident    (Please note that portions of this note were completed with a voice recognition program.  Efforts were made to edit the dictations but occasionally words are mis-transcribed.)